# Patient Record
Sex: FEMALE | Race: WHITE | NOT HISPANIC OR LATINO | Employment: UNEMPLOYED | URBAN - NONMETROPOLITAN AREA
[De-identification: names, ages, dates, MRNs, and addresses within clinical notes are randomized per-mention and may not be internally consistent; named-entity substitution may affect disease eponyms.]

---

## 2021-10-22 ENCOUNTER — APPOINTMENT (OUTPATIENT)
Dept: CT IMAGING | Facility: HOSPITAL | Age: 56
End: 2021-10-22

## 2021-10-22 ENCOUNTER — HOSPITAL ENCOUNTER (EMERGENCY)
Facility: HOSPITAL | Age: 56
Discharge: ANOTHER HOSPITAL | End: 2021-10-22
Attending: EMERGENCY MEDICINE | Admitting: EMERGENCY MEDICINE

## 2021-10-22 VITALS
TEMPERATURE: 97.5 F | HEART RATE: 65 BPM | WEIGHT: 120 LBS | HEIGHT: 62 IN | OXYGEN SATURATION: 98 % | DIASTOLIC BLOOD PRESSURE: 69 MMHG | SYSTOLIC BLOOD PRESSURE: 117 MMHG | RESPIRATION RATE: 18 BRPM | BODY MASS INDEX: 22.08 KG/M2

## 2021-10-22 DIAGNOSIS — I61.9 INTRAPARENCHYMAL HEMORRHAGE OF BRAIN (HCC): ICD-10-CM

## 2021-10-22 DIAGNOSIS — S06.5XAA SUBDURAL HEMATOMA (HCC): ICD-10-CM

## 2021-10-22 DIAGNOSIS — S02.92XA MULTIPLE CLOSED FRACTURES OF FACIAL BONE, INITIAL ENCOUNTER (HCC): Primary | ICD-10-CM

## 2021-10-22 LAB
ALBUMIN SERPL-MCNC: 4.4 G/DL (ref 3.5–5.2)
ALBUMIN/GLOB SERPL: 1.6 G/DL
ALP SERPL-CCNC: 84 U/L (ref 39–117)
ALT SERPL W P-5'-P-CCNC: 16 U/L (ref 1–33)
ANION GAP SERPL CALCULATED.3IONS-SCNC: 11.1 MMOL/L (ref 5–15)
AST SERPL-CCNC: 20 U/L (ref 1–32)
BASOPHILS # BLD AUTO: 0.04 10*3/MM3 (ref 0–0.2)
BASOPHILS NFR BLD AUTO: 0.4 % (ref 0–1.5)
BILIRUB SERPL-MCNC: 0.4 MG/DL (ref 0–1.2)
BUN SERPL-MCNC: 13 MG/DL (ref 6–20)
BUN/CREAT SERPL: 14.4 (ref 7–25)
CALCIUM SPEC-SCNC: 9.4 MG/DL (ref 8.6–10.5)
CHLORIDE SERPL-SCNC: 106 MMOL/L (ref 98–107)
CO2 SERPL-SCNC: 24.9 MMOL/L (ref 22–29)
CREAT SERPL-MCNC: 0.9 MG/DL (ref 0.57–1)
DEPRECATED RDW RBC AUTO: 39.7 FL (ref 37–54)
EOSINOPHIL # BLD AUTO: 0.28 10*3/MM3 (ref 0–0.4)
EOSINOPHIL NFR BLD AUTO: 2.7 % (ref 0.3–6.2)
ERYTHROCYTE [DISTWIDTH] IN BLOOD BY AUTOMATED COUNT: 11.7 % (ref 12.3–15.4)
ETHANOL BLD-MCNC: <10 MG/DL (ref 0–10)
ETHANOL UR QL: <0.01 %
GFR SERPL CREATININE-BSD FRML MDRD: 65 ML/MIN/1.73
GLOBULIN UR ELPH-MCNC: 2.8 GM/DL
GLUCOSE SERPL-MCNC: 120 MG/DL (ref 65–99)
HCT VFR BLD AUTO: 39.8 % (ref 34–46.6)
HGB BLD-MCNC: 13.3 G/DL (ref 12–15.9)
IMM GRANULOCYTES # BLD AUTO: 0.06 10*3/MM3 (ref 0–0.05)
IMM GRANULOCYTES NFR BLD AUTO: 0.6 % (ref 0–0.5)
LIPASE SERPL-CCNC: 28 U/L (ref 13–60)
LYMPHOCYTES # BLD AUTO: 2.76 10*3/MM3 (ref 0.7–3.1)
LYMPHOCYTES NFR BLD AUTO: 26.3 % (ref 19.6–45.3)
MCH RBC QN AUTO: 30.9 PG (ref 26.6–33)
MCHC RBC AUTO-ENTMCNC: 33.4 G/DL (ref 31.5–35.7)
MCV RBC AUTO: 92.6 FL (ref 79–97)
MONOCYTES # BLD AUTO: 0.55 10*3/MM3 (ref 0.1–0.9)
MONOCYTES NFR BLD AUTO: 5.2 % (ref 5–12)
NEUTROPHILS NFR BLD AUTO: 6.82 10*3/MM3 (ref 1.7–7)
NEUTROPHILS NFR BLD AUTO: 64.8 % (ref 42.7–76)
NRBC BLD AUTO-RTO: 0 /100 WBC (ref 0–0.2)
PLATELET # BLD AUTO: 238 10*3/MM3 (ref 140–450)
PMV BLD AUTO: 8.7 FL (ref 6–12)
POTASSIUM SERPL-SCNC: 3.4 MMOL/L (ref 3.5–5.2)
PROT SERPL-MCNC: 7.2 G/DL (ref 6–8.5)
RBC # BLD AUTO: 4.3 10*6/MM3 (ref 3.77–5.28)
SODIUM SERPL-SCNC: 142 MMOL/L (ref 136–145)
WBC # BLD AUTO: 10.51 10*3/MM3 (ref 3.4–10.8)

## 2021-10-22 PROCEDURE — 25010000003 LIDOCAINE 1 % SOLUTION: Performed by: PHYSICIAN ASSISTANT

## 2021-10-22 PROCEDURE — 82077 ASSAY SPEC XCP UR&BREATH IA: CPT | Performed by: PHYSICIAN ASSISTANT

## 2021-10-22 PROCEDURE — 25010000002 TDAP 5-2.5-18.5 LF-MCG/0.5 SUSPENSION: Performed by: PHYSICIAN ASSISTANT

## 2021-10-22 PROCEDURE — 0 CEFAZOLIN SODIUM-DEXTROSE 2-3 GM-%(50ML) RECONSTITUTED SOLUTION: Performed by: PHYSICIAN ASSISTANT

## 2021-10-22 PROCEDURE — 70486 CT MAXILLOFACIAL W/O DYE: CPT

## 2021-10-22 PROCEDURE — 83690 ASSAY OF LIPASE: CPT | Performed by: PHYSICIAN ASSISTANT

## 2021-10-22 PROCEDURE — 25010000003 CEFAZOLIN SODIUM-DEXTROSE 2-3 GM-%(50ML) RECONSTITUTED SOLUTION: Performed by: PHYSICIAN ASSISTANT

## 2021-10-22 PROCEDURE — 90471 IMMUNIZATION ADMIN: CPT | Performed by: PHYSICIAN ASSISTANT

## 2021-10-22 PROCEDURE — 99283 EMERGENCY DEPT VISIT LOW MDM: CPT

## 2021-10-22 PROCEDURE — 96375 TX/PRO/DX INJ NEW DRUG ADDON: CPT

## 2021-10-22 PROCEDURE — 80053 COMPREHEN METABOLIC PANEL: CPT | Performed by: PHYSICIAN ASSISTANT

## 2021-10-22 PROCEDURE — 0 LIDOCAINE 1 % SOLUTION: Performed by: PHYSICIAN ASSISTANT

## 2021-10-22 PROCEDURE — 96365 THER/PROPH/DIAG IV INF INIT: CPT

## 2021-10-22 PROCEDURE — 25010000002 ONDANSETRON PER 1 MG: Performed by: EMERGENCY MEDICINE

## 2021-10-22 PROCEDURE — 90715 TDAP VACCINE 7 YRS/> IM: CPT | Performed by: PHYSICIAN ASSISTANT

## 2021-10-22 PROCEDURE — 85025 COMPLETE CBC W/AUTO DIFF WBC: CPT | Performed by: PHYSICIAN ASSISTANT

## 2021-10-22 PROCEDURE — 70450 CT HEAD/BRAIN W/O DYE: CPT

## 2021-10-22 PROCEDURE — 72125 CT NECK SPINE W/O DYE: CPT

## 2021-10-22 PROCEDURE — 25010000003 MORPHINE SULFATE (PF) 4 MG/ML SOLUTION: Performed by: EMERGENCY MEDICINE

## 2021-10-22 PROCEDURE — 0 MORPHINE SULFATE (PF) 4 MG/ML SOLUTION: Performed by: EMERGENCY MEDICINE

## 2021-10-22 RX ORDER — MORPHINE SULFATE 4 MG/ML
4 INJECTION, SOLUTION INTRAMUSCULAR; INTRAVENOUS ONCE
Status: COMPLETED | OUTPATIENT
Start: 2021-10-22 | End: 2021-10-22

## 2021-10-22 RX ORDER — ONDANSETRON 2 MG/ML
4 INJECTION INTRAMUSCULAR; INTRAVENOUS ONCE
Status: COMPLETED | OUTPATIENT
Start: 2021-10-22 | End: 2021-10-22

## 2021-10-22 RX ORDER — CEFAZOLIN SODIUM 2 G/50ML
2 SOLUTION INTRAVENOUS ONCE
Status: COMPLETED | OUTPATIENT
Start: 2021-10-22 | End: 2021-10-22

## 2021-10-22 RX ORDER — LIDOCAINE HYDROCHLORIDE 10 MG/ML
10 INJECTION, SOLUTION INFILTRATION; PERINEURAL ONCE
Status: COMPLETED | OUTPATIENT
Start: 2021-10-22 | End: 2021-10-22

## 2021-10-22 RX ADMIN — CEFAZOLIN SODIUM 2 G: 2 SOLUTION INTRAVENOUS at 16:52

## 2021-10-22 RX ADMIN — LIDOCAINE HYDROCHLORIDE 10 ML: 10 INJECTION, SOLUTION INFILTRATION; PERINEURAL at 16:53

## 2021-10-22 RX ADMIN — ONDANSETRON 4 MG: 2 INJECTION INTRAMUSCULAR; INTRAVENOUS at 15:56

## 2021-10-22 RX ADMIN — TETANUS TOXOID, REDUCED DIPHTHERIA TOXOID AND ACELLULAR PERTUSSIS VACCINE, ADSORBED 0.5 ML: 5; 2.5; 8; 8; 2.5 SUSPENSION INTRAMUSCULAR at 16:52

## 2021-10-22 RX ADMIN — MORPHINE SULFATE 4 MG: 4 INJECTION, SOLUTION INTRAMUSCULAR; INTRAVENOUS at 15:56

## 2021-10-22 NOTE — ED NOTES
Contacted [facility] for trauma per KRISTIN Carcamo. Awaiting a call back.     Maritza Bear  10/22/21 4722
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
## 2022-07-28 ENCOUNTER — HOSPITAL ENCOUNTER (EMERGENCY)
Facility: HOSPITAL | Age: 57
Discharge: HOME | End: 2022-07-28
Attending: EMERGENCY MEDICINE | Admitting: EMERGENCY MEDICINE

## 2022-07-28 VITALS
HEIGHT: 63 IN | WEIGHT: 149.4 LBS | DIASTOLIC BLOOD PRESSURE: 60 MMHG | HEART RATE: 63 BPM | BODY MASS INDEX: 26.47 KG/M2 | TEMPERATURE: 98 F | OXYGEN SATURATION: 96 % | SYSTOLIC BLOOD PRESSURE: 124 MMHG | RESPIRATION RATE: 20 BRPM

## 2022-07-28 DIAGNOSIS — R59.1 LYMPHADENOPATHY: Primary | ICD-10-CM

## 2022-07-28 PROCEDURE — 99282 EMERGENCY DEPT VISIT SF MDM: CPT

## 2022-07-28 RX ORDER — CLINDAMYCIN HYDROCHLORIDE 300 MG/1
300 CAPSULE ORAL 3 TIMES DAILY
Qty: 21 CAPSULE | Refills: 0 | Status: SHIPPED | OUTPATIENT
Start: 2022-07-28 | End: 2022-08-04

## 2022-07-28 RX ORDER — NAPROXEN 500 MG/1
500 TABLET ORAL 2 TIMES DAILY PRN
Qty: 14 TABLET | Refills: 0 | OUTPATIENT
Start: 2022-07-28 | End: 2022-08-10

## 2023-03-29 ENCOUNTER — HOSPITAL ENCOUNTER (EMERGENCY)
Facility: HOSPITAL | Age: 58
Discharge: HOME | End: 2023-03-29
Attending: EMERGENCY MEDICINE | Admitting: EMERGENCY MEDICINE
Payer: MEDICAID

## 2023-03-29 VITALS
WEIGHT: 150 LBS | SYSTOLIC BLOOD PRESSURE: 118 MMHG | RESPIRATION RATE: 16 BRPM | DIASTOLIC BLOOD PRESSURE: 70 MMHG | TEMPERATURE: 98.4 F | BODY MASS INDEX: 27.6 KG/M2 | HEIGHT: 62 IN | HEART RATE: 72 BPM | OXYGEN SATURATION: 96 %

## 2023-03-29 DIAGNOSIS — R11.2 NAUSEA AND VOMITING, UNSPECIFIED VOMITING TYPE: Primary | ICD-10-CM

## 2023-03-29 LAB
ALBUMIN SERPL-MCNC: 4.2 G/DL (ref 3.5–5.2)
ALBUMIN/GLOB SERPL: 1.3 G/DL
ALP SERPL-CCNC: 94 U/L (ref 39–117)
ALT SERPL W P-5'-P-CCNC: 14 U/L (ref 1–33)
ANION GAP SERPL CALCULATED.3IONS-SCNC: 11.5 MMOL/L (ref 5–15)
AST SERPL-CCNC: 18 U/L (ref 1–32)
BASOPHILS # BLD AUTO: 0.04 10*3/MM3 (ref 0–0.2)
BASOPHILS NFR BLD AUTO: 0.4 % (ref 0–1.5)
BILIRUB SERPL-MCNC: 0.3 MG/DL (ref 0–1.2)
BILIRUB UR QL STRIP: NEGATIVE
BUN SERPL-MCNC: 11 MG/DL (ref 6–20)
BUN/CREAT SERPL: 12.2 (ref 7–25)
CALCIUM SPEC-SCNC: 9.3 MG/DL (ref 8.6–10.5)
CHLORIDE SERPL-SCNC: 107 MMOL/L (ref 98–107)
CLARITY UR: CLEAR
CO2 SERPL-SCNC: 21.5 MMOL/L (ref 22–29)
COLOR UR: YELLOW
CREAT SERPL-MCNC: 0.9 MG/DL (ref 0.57–1)
DEPRECATED RDW RBC AUTO: 38.7 FL (ref 37–54)
EGFRCR SERPLBLD CKD-EPI 2021: 74.7 ML/MIN/1.73
EOSINOPHIL # BLD AUTO: 0.08 10*3/MM3 (ref 0–0.4)
EOSINOPHIL NFR BLD AUTO: 0.9 % (ref 0.3–6.2)
ERYTHROCYTE [DISTWIDTH] IN BLOOD BY AUTOMATED COUNT: 11.7 % (ref 12.3–15.4)
GLOBULIN UR ELPH-MCNC: 3.2 GM/DL
GLUCOSE SERPL-MCNC: 101 MG/DL (ref 65–99)
GLUCOSE UR STRIP-MCNC: NEGATIVE MG/DL
HCT VFR BLD AUTO: 42 % (ref 34–46.6)
HGB BLD-MCNC: 14 G/DL (ref 12–15.9)
HGB UR QL STRIP.AUTO: NEGATIVE
IMM GRANULOCYTES # BLD AUTO: 0.02 10*3/MM3 (ref 0–0.05)
IMM GRANULOCYTES NFR BLD AUTO: 0.2 % (ref 0–0.5)
KETONES UR QL STRIP: NEGATIVE
LEUKOCYTE ESTERASE UR QL STRIP.AUTO: NEGATIVE
LYMPHOCYTES # BLD AUTO: 1.87 10*3/MM3 (ref 0.7–3.1)
LYMPHOCYTES NFR BLD AUTO: 20.1 % (ref 19.6–45.3)
MCH RBC QN AUTO: 30.4 PG (ref 26.6–33)
MCHC RBC AUTO-ENTMCNC: 33.3 G/DL (ref 31.5–35.7)
MCV RBC AUTO: 91.1 FL (ref 79–97)
MONOCYTES # BLD AUTO: 0.36 10*3/MM3 (ref 0.1–0.9)
MONOCYTES NFR BLD AUTO: 3.9 % (ref 5–12)
NEUTROPHILS NFR BLD AUTO: 6.93 10*3/MM3 (ref 1.7–7)
NEUTROPHILS NFR BLD AUTO: 74.5 % (ref 42.7–76)
NITRITE UR QL STRIP: NEGATIVE
NRBC BLD AUTO-RTO: 0 /100 WBC (ref 0–0.2)
PH UR STRIP.AUTO: 6 [PH] (ref 5–8)
PLATELET # BLD AUTO: 279 10*3/MM3 (ref 140–450)
PMV BLD AUTO: 9 FL (ref 6–12)
POTASSIUM SERPL-SCNC: 4.2 MMOL/L (ref 3.5–5.2)
PROT SERPL-MCNC: 7.4 G/DL (ref 6–8.5)
PROT UR QL STRIP: NEGATIVE
RBC # BLD AUTO: 4.61 10*6/MM3 (ref 3.77–5.28)
SODIUM SERPL-SCNC: 140 MMOL/L (ref 136–145)
SP GR UR STRIP: <=1.005 (ref 1–1.03)
UROBILINOGEN UR QL STRIP: NORMAL
WBC NRBC COR # BLD: 9.3 10*3/MM3 (ref 3.4–10.8)

## 2023-03-29 PROCEDURE — 25010000002 ONDANSETRON PER 1 MG: Performed by: EMERGENCY MEDICINE

## 2023-03-29 PROCEDURE — 81003 URINALYSIS AUTO W/O SCOPE: CPT | Performed by: EMERGENCY MEDICINE

## 2023-03-29 PROCEDURE — 80053 COMPREHEN METABOLIC PANEL: CPT | Performed by: EMERGENCY MEDICINE

## 2023-03-29 PROCEDURE — 96374 THER/PROPH/DIAG INJ IV PUSH: CPT

## 2023-03-29 PROCEDURE — 99283 EMERGENCY DEPT VISIT LOW MDM: CPT

## 2023-03-29 PROCEDURE — 85025 COMPLETE CBC W/AUTO DIFF WBC: CPT | Performed by: EMERGENCY MEDICINE

## 2023-03-29 RX ORDER — DICYCLOMINE HCL 20 MG
20 TABLET ORAL EVERY 6 HOURS PRN
Qty: 20 TABLET | Refills: 0 | Status: SHIPPED | OUTPATIENT
Start: 2023-03-29

## 2023-03-29 RX ORDER — ONDANSETRON 4 MG/1
4 TABLET, ORALLY DISINTEGRATING ORAL 4 TIMES DAILY PRN
Qty: 20 TABLET | Refills: 0 | Status: SHIPPED | OUTPATIENT
Start: 2023-03-29

## 2023-03-29 RX ORDER — DICYCLOMINE HYDROCHLORIDE 10 MG/1
20 CAPSULE ORAL ONCE
Status: COMPLETED | OUTPATIENT
Start: 2023-03-29 | End: 2023-03-29

## 2023-03-29 RX ORDER — SODIUM CHLORIDE 0.9 % (FLUSH) 0.9 %
10 SYRINGE (ML) INJECTION AS NEEDED
Status: DISCONTINUED | OUTPATIENT
Start: 2023-03-29 | End: 2023-03-29 | Stop reason: HOSPADM

## 2023-03-29 RX ORDER — ONDANSETRON 2 MG/ML
4 INJECTION INTRAMUSCULAR; INTRAVENOUS ONCE
Status: COMPLETED | OUTPATIENT
Start: 2023-03-29 | End: 2023-03-29

## 2023-03-29 RX ADMIN — ONDANSETRON 4 MG: 2 INJECTION INTRAMUSCULAR; INTRAVENOUS at 17:43

## 2023-03-29 RX ADMIN — DICYCLOMINE HYDROCHLORIDE 20 MG: 10 CAPSULE ORAL at 17:44

## 2023-03-29 RX ADMIN — SODIUM CHLORIDE 1000 ML: 9 INJECTION, SOLUTION INTRAVENOUS at 17:41

## 2023-03-29 NOTE — ED PROVIDER NOTES
Subjective   History of Present Illness  57-year-old female presents to ED with chief complaint of nausea vomiting and abdominal pain.  Symptoms started earlier this morning.  Multiple family members with GI illness.  Diffuse generalized abdominal discomfort.  No fever or chills.  No cough shortness of breath or wheeze.  No other complaints this time.        Review of Systems   Constitutional: Negative for fatigue and fever.   Respiratory: Negative for shortness of breath.    Cardiovascular: Negative for chest pain and palpitations.   Gastrointestinal: Positive for abdominal pain, nausea and vomiting. Negative for diarrhea.   All other systems reviewed and are negative.      Past Medical History:   Diagnosis Date   • Diabetes mellitus        No Known Allergies    History reviewed. No pertinent surgical history.    History reviewed. No pertinent family history.    Social History     Socioeconomic History   • Marital status: Single   Tobacco Use   • Smoking status: Every Day     Packs/day: 0.50     Types: Cigarettes   • Smokeless tobacco: Never   Substance and Sexual Activity   • Alcohol use: Not Currently   • Drug use: Never   • Sexual activity: Defer           Objective   Physical Exam  Vitals and nursing note reviewed.   Constitutional:       General: She is not in acute distress.     Appearance: She is well-developed. She is not diaphoretic.   HENT:      Head: Normocephalic and atraumatic.      Nose: Nose normal.   Eyes:      Conjunctiva/sclera: Conjunctivae normal.   Cardiovascular:      Rate and Rhythm: Normal rate and regular rhythm.   Pulmonary:      Effort: Pulmonary effort is normal. No respiratory distress.      Breath sounds: Normal breath sounds.   Abdominal:      General: There is no distension.      Palpations: Abdomen is soft.      Tenderness: There is no abdominal tenderness. There is no guarding.   Musculoskeletal:         General: No deformity.   Neurological:      Mental Status: She is alert and  oriented to person, place, and time.      Cranial Nerves: No cranial nerve deficit.         Procedures           ED Course                                           MDM   Chief complaint: Nausea vomiting abdominal pain    Initial impression of presenting illness: 57-year-old well-appearing female with nausea vomiting and abdominal pain    Comorbidities requiring consideration and/or management: Diabetes    Differential diagnosis includes but not limited to: Viral gastroenteritis, bowel obstruction, pancreatitis, gastritis, other    Patient arrives hemodynamically stable, afebrile, without respiratory distress with initial vitals interpreted by myself.      Pertinent features from physical exam: Minimal abdominal tenderness to palpation, no guarding, no rigidity    Initial diagnostic plan: CBC, CMP, urinalysis, lipase, will consider CT imaging if indicated    Results from initial plan were reviewed and interpreted by myself and include: Normal urinalysis, CBC and CMP are reassuring      Diagnostic information from other sources includes: Review of previous visits, prior labs, prior imaging, available notes from prior evaluations or visits with specialists, medication list, allergies, past medical history, past surgical history    Interventions in the ED included: IV fluid hydration, antiemetics,    Reevaluation: Resting comfortably, abdomen is soft with minimal tenderness, given multiple family members with comorbidities have low suspicion for acute intra-abdominal process and suspect more likely a viral illness we will hold off on CT at this time    Results/clinical rationale were discussed with patient     Consultations and discussions of results with other physicians: N/A    Discussion of results/management/plan: Work-up most consistent with a likely viral gastroenteritis secondary to sick contacts.  Feeling better after symptomatic treatment    Disposition plan: Discharge, Rx management includes  antiemetics.    Final diagnoses:   Nausea and vomiting, unspecified vomiting type       ED Disposition  ED Disposition     ED Disposition   Discharge    Condition   Stable    Comment   --              oral and maxillofacial surgery  [phone]  [address] [address]             Medication List      New Prescriptions    dicyclomine 20 MG tablet  Commonly known as: BENTYL  Take 1 tablet by mouth Every 6 (Six) Hours As Needed (cramping).     ondansetron ODT 4 MG disintegrating tablet  Commonly known as: ZOFRAN-ODT  Place 1 tablet on the tongue 4 (Four) Times a Day As Needed for Nausea.           Where to Get Your Medications      These medications were sent to [id number] - [place], [place] - [address] AT [facility] - [phone] PH - [phone] FX  [address], [facility] [address]    Phone: [phone]   · dicyclomine 20 MG tablet  · ondansetron ODT 4 MG disintegrating tablet          Blas Ronquillo, DO  04/05/23 0739

## 2024-03-26 ENCOUNTER — ANESTHESIA EVENT (OUTPATIENT)
Dept: GASTROENTEROLOGY | Facility: HOSPITAL | Age: 59
End: 2024-03-26
Payer: MEDICAID

## 2024-03-26 ENCOUNTER — ANESTHESIA (OUTPATIENT)
Dept: GASTROENTEROLOGY | Facility: HOSPITAL | Age: 59
End: 2024-03-26
Payer: MEDICAID

## 2024-03-26 ENCOUNTER — HOSPITAL ENCOUNTER (EMERGENCY)
Facility: HOSPITAL | Age: 59
Discharge: HOME | End: 2024-03-26
Attending: STUDENT IN AN ORGANIZED HEALTH CARE EDUCATION/TRAINING PROGRAM | Admitting: INTERNAL MEDICINE
Payer: MEDICAID

## 2024-03-26 VITALS
BODY MASS INDEX: 26.17 KG/M2 | TEMPERATURE: 97.5 F | SYSTOLIC BLOOD PRESSURE: 134 MMHG | DIASTOLIC BLOOD PRESSURE: 82 MMHG | HEART RATE: 75 BPM | WEIGHT: 138.6 LBS | HEIGHT: 61 IN | OXYGEN SATURATION: 96 % | RESPIRATION RATE: 22 BRPM

## 2024-03-26 DIAGNOSIS — R13.10 DYSPHAGIA: ICD-10-CM

## 2024-03-26 DIAGNOSIS — R09.A2 GLOBUS SENSATION: Primary | ICD-10-CM

## 2024-03-26 PROCEDURE — 25810000003 SODIUM CHLORIDE 0.9 % SOLUTION: Performed by: NURSE ANESTHETIST, CERTIFIED REGISTERED

## 2024-03-26 PROCEDURE — 99285 EMERGENCY DEPT VISIT HI MDM: CPT

## 2024-03-26 PROCEDURE — 25010000002 METOCLOPRAMIDE PER 10 MG: Performed by: NURSE ANESTHETIST, CERTIFIED REGISTERED

## 2024-03-26 PROCEDURE — 43239 EGD BIOPSY SINGLE/MULTIPLE: CPT | Performed by: INTERNAL MEDICINE

## 2024-03-26 PROCEDURE — 99284 EMERGENCY DEPT VISIT MOD MDM: CPT | Performed by: INTERNAL MEDICINE

## 2024-03-26 PROCEDURE — 25010000002 PROPOFOL 10 MG/ML EMULSION: Performed by: NURSE ANESTHETIST, CERTIFIED REGISTERED

## 2024-03-26 PROCEDURE — 43247 EGD REMOVE FOREIGN BODY: CPT | Performed by: INTERNAL MEDICINE

## 2024-03-26 PROCEDURE — 96374 THER/PROPH/DIAG INJ IV PUSH: CPT

## 2024-03-26 PROCEDURE — 25010000002 GLUCAGON (RDNA) PER 1 MG: Performed by: STUDENT IN AN ORGANIZED HEALTH CARE EDUCATION/TRAINING PROGRAM

## 2024-03-26 RX ORDER — LIDOCAINE HCL/PF 100 MG/5ML
SYRINGE (ML) INJECTION AS NEEDED
Status: DISCONTINUED | OUTPATIENT
Start: 2024-03-26 | End: 2024-03-26 | Stop reason: SURG

## 2024-03-26 RX ORDER — IBUPROFEN 600 MG/1
1 TABLET ORAL ONCE
Qty: 1 MG | Refills: 0 | Status: COMPLETED | OUTPATIENT
Start: 2024-03-26 | End: 2024-03-26

## 2024-03-26 RX ORDER — SODIUM CHLORIDE 9 MG/ML
INJECTION, SOLUTION INTRAVENOUS CONTINUOUS PRN
Status: DISCONTINUED | OUTPATIENT
Start: 2024-03-26 | End: 2024-03-26 | Stop reason: SURG

## 2024-03-26 RX ORDER — PROPOFOL 10 MG/ML
VIAL (ML) INTRAVENOUS AS NEEDED
Status: DISCONTINUED | OUTPATIENT
Start: 2024-03-26 | End: 2024-03-26 | Stop reason: SURG

## 2024-03-26 RX ORDER — METOCLOPRAMIDE HYDROCHLORIDE 5 MG/ML
INJECTION INTRAMUSCULAR; INTRAVENOUS AS NEEDED
Status: DISCONTINUED | OUTPATIENT
Start: 2024-03-26 | End: 2024-03-26 | Stop reason: SURG

## 2024-03-26 RX ORDER — KETAMINE HCL IN NACL, ISO-OSM 100MG/10ML
SYRINGE (ML) INJECTION AS NEEDED
Status: DISCONTINUED | OUTPATIENT
Start: 2024-03-26 | End: 2024-03-26 | Stop reason: SURG

## 2024-03-26 RX ORDER — PANTOPRAZOLE SODIUM 40 MG/1
40 TABLET, DELAYED RELEASE ORAL DAILY
Qty: 30 TABLET | Refills: 1 | Status: SHIPPED | OUTPATIENT
Start: 2024-03-26

## 2024-03-26 RX ADMIN — SODIUM CHLORIDE: 9 INJECTION, SOLUTION INTRAVENOUS at 21:52

## 2024-03-26 RX ADMIN — PROPOFOL 100 MG: 10 INJECTION, EMULSION INTRAVENOUS at 21:58

## 2024-03-26 RX ADMIN — Medication 25 MG: at 21:58

## 2024-03-26 RX ADMIN — GLUCAGON 1 MG: KIT at 20:03

## 2024-03-26 RX ADMIN — METOCLOPRAMIDE 5 MG: 5 INJECTION, SOLUTION INTRAMUSCULAR; INTRAVENOUS at 22:05

## 2024-03-26 RX ADMIN — PROPOFOL 140 MCG/KG/MIN: 10 INJECTION, EMULSION INTRAVENOUS at 21:58

## 2024-03-26 RX ADMIN — Medication 40 MG: at 21:58

## 2024-03-26 NOTE — ED PROVIDER NOTES
Subjective:  History of Present Illness:    Patient is a 58-year-old female no significant medical history presents today with globus sensation.  Reports that she was eating a steak at [facility] when she felt as though it has been stuck.  States that she has food that is gotten intermittently hung in the past but is never had it been this persistent.  Denies any fevers.  No trauma.  Denies any preceding medical complaints.      Nurses Notes reviewed and agree, including vitals, allergies, social history and prior medical history.     REVIEW OF SYSTEMS: All systems reviewed and not pertinent unless noted.  Review of Systems   Constitutional:  Negative for activity change, appetite change, chills, fatigue and fever.   HENT:  Negative for rhinorrhea, sinus pressure and sinus pain.    Eyes:  Negative for discharge and itching.   Respiratory:  Negative for cough and shortness of breath.    Cardiovascular:  Negative for chest pain and leg swelling.   Gastrointestinal:  Negative for abdominal distention, abdominal pain, nausea and vomiting.        Lobus sensation   Endocrine: Negative for cold intolerance and heat intolerance.   Genitourinary:  Negative for decreased urine volume, difficulty urinating, flank pain, frequency, urgency, vaginal bleeding, vaginal discharge and vaginal pain.   Musculoskeletal:  Negative for gait problem, neck pain and neck stiffness.   Skin:  Negative for color change.   Allergic/Immunologic: Negative for environmental allergies.   Neurological:  Negative for seizures, syncope, facial asymmetry and speech difficulty.   Psychiatric/Behavioral:  Negative for self-injury and suicidal ideas.        Past Medical History:   Diagnosis Date    Hepatitis C        Allergies:    Patient has no known allergies.      History reviewed. No pertinent surgical history.      Social History     Socioeconomic History    Marital status: Single   Tobacco Use    Smoking status: Every Day     Current packs/day:  "0.50     Types: Cigarettes    Smokeless tobacco: Never   Vaping Use    Vaping status: Every Day   Substance and Sexual Activity    Alcohol use: Not Currently    Drug use: Not Currently    Sexual activity: Defer         History reviewed. No pertinent family history.    Objective  Physical Exam:  /82 (BP Location: Left arm, Patient Position: Lying)   Pulse 75   Temp 97.5 °F (36.4 °C) (Temporal)   Resp 22   Ht 154.9 cm (61\")   Wt 62.9 kg (138 lb 9.6 oz)   SpO2 96%   BMI 26.19 kg/m²      Physical Exam  Constitutional:       General: She is not in acute distress.     Appearance: Normal appearance. She is not ill-appearing.   HENT:      Head: Normocephalic and atraumatic.      Nose: Nose normal. No congestion or rhinorrhea.      Mouth/Throat:      Mouth: Mucous membranes are dry.      Pharynx: Oropharynx is clear. No oropharyngeal exudate or posterior oropharyngeal erythema.   Eyes:      Extraocular Movements: Extraocular movements intact.      Conjunctiva/sclera: Conjunctivae normal.      Pupils: Pupils are equal, round, and reactive to light.   Cardiovascular:      Rate and Rhythm: Normal rate and regular rhythm.      Pulses: Normal pulses.      Heart sounds: Normal heart sounds.   Pulmonary:      Effort: Pulmonary effort is normal. No respiratory distress.      Breath sounds: Normal breath sounds.   Abdominal:      General: Abdomen is flat. Bowel sounds are normal. There is no distension.      Palpations: Abdomen is soft.      Tenderness: There is no abdominal tenderness. There is no guarding or rebound.   Musculoskeletal:         General: No swelling or tenderness. Normal range of motion.      Cervical back: Normal range of motion and neck supple.   Skin:     General: Skin is warm and dry.      Capillary Refill: Capillary refill takes less than 2 seconds.   Neurological:      General: No focal deficit present.      Mental Status: She is alert and oriented to person, place, and time. Mental status is at " baseline.      Cranial Nerves: No cranial nerve deficit.      Sensory: No sensory deficit.      Motor: No weakness.      Coordination: Coordination normal.   Psychiatric:         Mood and Affect: Mood normal.         Behavior: Behavior normal.         Thought Content: Thought content normal.         Judgment: Judgment normal.         Procedures    ED Course:         Lab Results (last 24 hours)       ** No results found for the last 24 hours. **             No radiology results from the last 24 hrs       MDM      Initial impression of presenting illness: Globus sensation    DDX: includes but is not limited to: Impacted food bolus, esophageal perforation, esophageal trauma    Patient arrives stable with vitals interpreted by myself.     Pertinent features from physical exam: Clear oscitation, regular rhythm, no murmur, nontender gentle palpation.    Initial diagnostic plan: No need for lab workup, will attempt cola, if unsuccessful will attempt glucagon, if unsuccessful will consult to gastroenterology    Results from initial plan were reviewed and interpreted by me revealing patient continued to have persistent symptoms after cola and glucagon.    Diagnostic information from other sources: Reviewed past medical records    Interventions / Re-evaluation: Attempted: Challenge, glucagon with still persistent inability tolerate p.o.    Results/clinical rationale were discussed with patient at bedside    Consultations/Discussion of results with other physicians: Given my concern for impacted food bolus, discussed with gastroenterology who will take the patient for endoscopy    Disposition plan: To the OR  -----        Final diagnoses:   Globus sensation          Alexandre Kirkpatrick MD  03/26/24 0785

## 2024-03-27 NOTE — CONSULTS
In Patient Consult      Date of Consultation: 2024  Patient Name: Claudia Bosch  MRN: [id number]  : 1965     Referring provider: No att. providers found    Primary care provider:  Provider, No Known    Reason for consultation: Esophageal food bolus    History of Present Illness:.  58-year-old female patient with a prior history of chronic dysphagia, gastroesophageal reflux disease, hepatitis C previously treated was brought into emergency room tonight on 2024 with complaints of food bolus stuck in the esophagus.    She states that she has been having issues with swallowing especially with solids for a while now.  For the last couple of weeks she had a significant difficulty in swallowing solids.  She did have a food bolus stuck in the past but passed out spontaneously in the past no prior EGD.  Denies any prior esophageal stricture or dilatation.    Patient received glucagon and did not get any benefit and GI was consulted.    Subjective     Past Medical History:   Diagnosis Date    Hepatitis C        History reviewed. No pertinent surgical history.    History reviewed. No pertinent family history.    Social History     Socioeconomic History    Marital status: Single   Tobacco Use    Smoking status: Every Day     Current packs/day: 0.50     Types: Cigarettes    Smokeless tobacco: Never   Vaping Use    Vaping status: Every Day   Substance and Sexual Activity    Alcohol use: Not Currently    Drug use: Not Currently    Sexual activity: Defer       No current facility-administered medications for this encounter.    No Known Allergies    Review of Systems   Constitutional:  Negative for appetite change, fatigue, fever and unexpected weight loss.   HENT:  Positive for trouble swallowing.    Gastrointestinal:  Negative for abdominal distention, abdominal pain, anal bleeding, blood in stool, constipation, diarrhea, nausea, rectal pain, vomiting, GERD and indigestion.        The following  "portions of the patient's history were reviewed and updated as appropriate: allergies, current medications, past family history, past medical history, past social history, past surgical history and problem list.    Objective     Vitals:    03/26/24 1920 03/26/24 2129   BP: 129/74 135/63   BP Location: Left arm    Pulse: 88 74   Resp: 18 18   Temp: 97.9 °F (36.6 °C)    TempSrc: Oral    SpO2: 100% 95%   Weight: 62.9 kg (138 lb 9.6 oz)    Height: 154.9 cm (61\")        Physical Exam  Constitutional:       Appearance: Normal appearance.   HENT:      Head: Normocephalic and atraumatic.   Eyes:      Conjunctiva/sclera: Conjunctivae normal.   Abdominal:      General: Abdomen is flat. There is no distension.      Palpations: There is no mass.      Tenderness: There is no abdominal tenderness. There is no guarding or rebound.      Hernia: No hernia is present.   Musculoskeletal:      Cervical back: Normal range of motion and neck supple.   Neurological:      Mental Status: She is alert.               Invalid input(s): \"PROT\"    Imaging Results (Last 24 Hours)       ** No results found for the last 24 hours. **            Assessment / Plan      Assessment/Recommendations:     History of esophageal dysphagia  Esophageal obstruction secondary to food bolus  Gastroesophageal reflux disease    Patient history and presentation is very convincing of esophageal food bolus.  Hiatal hernia with esophageal ring or EOE.  She needs an urgent EGD for further management.  She may need a repeat EGD for esophageal dilatation will recommend after EGD.    I discussed the risk and benefits involved with the procedure and patient is agreeable to proceed with the procedure    Keep n.p.o.  Schedule for an urgent EGD  Recommend further after EGD    4.  History of hep C infection  As per patient it was treated before.    Thank you very much for letting me participate in the care of this patient.  Please do not hesitate to call me if you have any " questions.    Cata Greene MD  [facility] [place]  3/26/2024  21:56 EDT    Please note that portions of this note may have been completed with a voice recognition program.

## 2024-03-27 NOTE — H&P (VIEW-ONLY)
In Patient Consult      Date of Consultation: 2024  Patient Name: Claudia Bosch  MRN: [id number]  : 1965     Referring provider: No att. providers found    Primary care provider:  Provider, No Known    Reason for consultation: Esophageal food bolus    History of Present Illness:.  58-year-old female patient with a prior history of chronic dysphagia, gastroesophageal reflux disease, hepatitis C previously treated was brought into emergency room tonight on 2024 with complaints of food bolus stuck in the esophagus.    She states that she has been having issues with swallowing especially with solids for a while now.  For the last couple of weeks she had a significant difficulty in swallowing solids.  She did have a food bolus stuck in the past but passed out spontaneously in the past no prior EGD.  Denies any prior esophageal stricture or dilatation.    Patient received glucagon and did not get any benefit and GI was consulted.    Subjective     Past Medical History:   Diagnosis Date    Hepatitis C        History reviewed. No pertinent surgical history.    History reviewed. No pertinent family history.    Social History     Socioeconomic History    Marital status: Single   Tobacco Use    Smoking status: Every Day     Current packs/day: 0.50     Types: Cigarettes    Smokeless tobacco: Never   Vaping Use    Vaping status: Every Day   Substance and Sexual Activity    Alcohol use: Not Currently    Drug use: Not Currently    Sexual activity: Defer       No current facility-administered medications for this encounter.    No Known Allergies    Review of Systems   Constitutional:  Negative for appetite change, fatigue, fever and unexpected weight loss.   HENT:  Positive for trouble swallowing.    Gastrointestinal:  Negative for abdominal distention, abdominal pain, anal bleeding, blood in stool, constipation, diarrhea, nausea, rectal pain, vomiting, GERD and indigestion.        The following  "portions of the patient's history were reviewed and updated as appropriate: allergies, current medications, past family history, past medical history, past social history, past surgical history and problem list.    Objective     Vitals:    03/26/24 1920 03/26/24 2129   BP: 129/74 135/63   BP Location: Left arm    Pulse: 88 74   Resp: 18 18   Temp: 97.9 °F (36.6 °C)    TempSrc: Oral    SpO2: 100% 95%   Weight: 62.9 kg (138 lb 9.6 oz)    Height: 154.9 cm (61\")        Physical Exam  Constitutional:       Appearance: Normal appearance.   HENT:      Head: Normocephalic and atraumatic.   Eyes:      Conjunctiva/sclera: Conjunctivae normal.   Abdominal:      General: Abdomen is flat. There is no distension.      Palpations: There is no mass.      Tenderness: There is no abdominal tenderness. There is no guarding or rebound.      Hernia: No hernia is present.   Musculoskeletal:      Cervical back: Normal range of motion and neck supple.   Neurological:      Mental Status: She is alert.               Invalid input(s): \"PROT\"    Imaging Results (Last 24 Hours)       ** No results found for the last 24 hours. **            Assessment / Plan      Assessment/Recommendations:     History of esophageal dysphagia  Esophageal obstruction secondary to food bolus  Gastroesophageal reflux disease    Patient history and presentation is very convincing of esophageal food bolus.  Hiatal hernia with esophageal ring or EOE.  She needs an urgent EGD for further management.  She may need a repeat EGD for esophageal dilatation will recommend after EGD.    I discussed the risk and benefits involved with the procedure and patient is agreeable to proceed with the procedure    Keep n.p.o.  Schedule for an urgent EGD  Recommend further after EGD    4.  History of hep C infection  As per patient it was treated before.    Thank you very much for letting me participate in the care of this patient.  Please do not hesitate to call me if you have any " questions.    Cata Greene MD  [facility]  3/26/2024  21:56 EDT    Please note that portions of this note may have been completed with a voice recognition program.

## 2024-03-27 NOTE — ANESTHESIA POSTPROCEDURE EVALUATION
Patient: Claudia Bosch    Procedure Summary       Date: 03/26/24 Room / Location: [facility] ENDOSCOPY 2 / [facility] ENDOSCOPY    Anesthesia Start: 2152 Anesthesia Stop: 2209    Procedure: ESOPHAGOGASTRODUODENOSCOPY WITH FOREIGN BODY REMOVAL and biopsy Diagnosis:     Surgeons: Cata Greene MD Provider: Jose Jones CRNA    Anesthesia Type: MAC ASA Status: 2 - Emergent            Anesthesia Type: MAC    Vitals  HR 95  Sat 98  /71  Resp 12  Temp 98        Post Anesthesia Care and Evaluation    Patient location during evaluation: PACU  Patient participation: complete - patient participated  Level of consciousness: awake and alert and sleepy but conscious  Pain management: satisfactory to patient    Airway patency: patent  Anesthetic complications: No anesthetic complications    Cardiovascular status: acceptable and stable  Respiratory status: acceptable and nasal cannula  Hydration status: acceptable

## 2024-03-27 NOTE — DISCHARGE INSTRUCTIONS
- Discharge patient to home (ambulatory).   - Mechanical soft diet today.   - Chopped food from tomorrow  - Continue present medications.   - Await pathology results.   - protonix x 8 weeks   - Repeat upper endoscopy in 4 weeks for retreatment -dilation.   - Return to GI office in 8 weeks.

## 2024-03-27 NOTE — ANESTHESIA PREPROCEDURE EVALUATION
Anesthesia Evaluation     Patient summary reviewed and Nursing notes reviewed   NPO Solid Status: > 8 hours  NPO Liquid Status: > 8 hours           Airway   Mallampati: II  TM distance: >3 FB  Neck ROM: full  Possible difficult intubation  Dental - normal exam     Pulmonary - normal exam   (+) a smoker Current,  Cardiovascular - normal exam        Neuro/Psych  GI/Hepatic/Renal/Endo    (+) hepatitis C, liver disease    Musculoskeletal     Abdominal  - normal exam    Bowel sounds: normal.   Substance History      OB/GYN          Other                      Anesthesia Plan    ASA 2 - emergent     MAC     (Risks and benefits discussed including risk of aspiration, recall and dental damage. All patient questions answered. Will continue with POC. )  intravenous induction     Anesthetic plan, risks, benefits, and alternatives have been provided, discussed and informed consent has been obtained with: patient.  Pre-procedure education provided    CODE STATUS:

## 2024-03-28 LAB — REF LAB TEST METHOD: NORMAL

## 2024-04-16 DIAGNOSIS — R13.10 DYSPHAGIA, UNSPECIFIED TYPE: ICD-10-CM

## 2024-04-16 DIAGNOSIS — R09.A2 GLOBUS SENSATION: Primary | ICD-10-CM

## 2024-04-16 RX ORDER — SODIUM CHLORIDE 9 MG/ML
30 INJECTION, SOLUTION INTRAVENOUS CONTINUOUS PRN
OUTPATIENT
Start: 2024-04-16

## 2024-04-17 PROBLEM — R09.A2 GLOBUS SENSATION: Status: ACTIVE | Noted: 2024-04-16

## 2024-04-17 PROBLEM — R13.10 DYSPHAGIA: Status: ACTIVE | Noted: 2024-04-16

## 2024-04-19 ENCOUNTER — TELEMEDICINE (OUTPATIENT)
Dept: FAMILY MEDICINE CLINIC | Facility: TELEHEALTH | Age: 59
End: 2024-04-19
Payer: MEDICAID

## 2024-04-19 DIAGNOSIS — K04.7 DENTAL ABSCESS: Primary | ICD-10-CM

## 2024-04-19 RX ORDER — ONDANSETRON 4 MG/1
TABLET, FILM COATED ORAL
COMMUNITY
Start: 2024-03-18

## 2024-04-19 RX ORDER — AMOXICILLIN AND CLAVULANATE POTASSIUM 875; 125 MG/1; MG/1
1 TABLET, FILM COATED ORAL 2 TIMES DAILY
Qty: 20 TABLET | Refills: 0 | Status: SHIPPED | OUTPATIENT
Start: 2024-04-19 | End: 2024-04-29

## 2024-04-19 RX ORDER — GUAIFENESIN 600 MG/1
TABLET, EXTENDED RELEASE ORAL
COMMUNITY
Start: 2024-03-18

## 2024-04-19 NOTE — PROGRESS NOTES
You have chosen to receive care through a telehealth visit.  Do you consent to use a video/audio connection for your medical care today? Yes     CHIEF COMPLAINT  No chief complaint on file.        HPI  Claudia Bosch is a 58 y.o. female  presents with complaint of filling came out of back lower left tooth which is very painful, swollen.  Denies fever.  Has surgery for esophageal hernia planned for 4/25/24 and cannot get to dentist right now.     Review of Systems  See HPI    Past Medical History:   Diagnosis Date    Hepatitis C        No family history on file.    Social History     Socioeconomic History    Marital status: Single   Tobacco Use    Smoking status: Every Day     Current packs/day: 0.50     Types: Cigarettes    Smokeless tobacco: Never   Vaping Use    Vaping status: Every Day   Substance and Sexual Activity    Alcohol use: Not Currently    Drug use: Not Currently    Sexual activity: Defer       Claudia Bosch  reports that she has been smoking cigarettes. She has never used smokeless tobacco.              There were no vitals taken for this visit.    PHYSICAL EXAM  Physical Exam   Constitutional: She is oriented to person, place, and time. She appears well-developed and well-nourished. She does not have a sickly appearance. She does not appear ill.   HENT:   Head: Normocephalic and atraumatic.   Pulmonary/Chest: Effort normal.  No respiratory distress.  Neurological: She is alert and oriented to person, place, and time.           Diagnoses and all orders for this visit:    1. Dental abscess (Primary)  -     amoxicillin-clavulanate (AUGMENTIN) 875-125 MG per tablet; Take 1 tablet by mouth 2 (Two) Times a Day for 10 days.  Dispense: 20 tablet; Refill: 0    --take medications as prescribed  --increase fluids, rest as needed, tylenol or ibuprofen for pain  --f/u in 5-7 days with dentist if no improvement        FOLLOW-UP  As discussed during visit with PCP/Virtual Care if no improvement or Urgent  Care/Emergency Department if worsening of symptoms    Patient verbalizes understanding of medication dosage, comfort measures, instructions for treatment and follow-up.    Taryn Dowell, NANETTE  04/19/2024  15:23 EDT    The use of a video visit has been reviewed with the patient and verbal informed consent has been obtained. Myself and Claudia Bosch participated in this visit. The patient is located in [address] [place] [address].    I am located in [place], [place]. [employer] and [employer] were utilized. I spent 8 minutes in the patient's chart for this visit.      Note Disclaimer: At [facility], we believe that sharing information builds trust and better   relationships. You are receiving this note because you recently visited [facility]. It is possible you   will see health information before a provider has talked with you about it. This kind of information can   be easy to misunderstand. To help you fully understand what it means for your health, we urge you to   discuss this note with your provider.

## 2024-04-19 NOTE — PATIENT INSTRUCTIONS
Dental Abscess    A dental abscess is an infection around a tooth that may involve pain, swelling, and a collection of pus, as well as other symptoms. Treatment is important to help with symptoms and to prevent the infection from spreading.  The general types of dental abscesses are:  Pulpal abscess. This abscess may form from the inner part of the tooth (pulp).  Periodontal abscess. This abscess may form from the gum.  What are the causes?  This condition is caused by a bacterial infection in or around the tooth. It may result from:  Severe tooth decay (cavities).  Trauma to the tooth, such as a broken or chipped tooth.  What increases the risk?  This condition is more likely to develop in males. It is also more likely to develop in people who:  Have cavities.  Have severe gum disease.  Eat sugary snacks between meals.  Use tobacco products.  Have diabetes.  Have a weakened disease-fighting system (immune system).  Do not brush and care for their teeth regularly.  What are the signs or symptoms?  Mild symptoms of this condition include:  Tenderness.  Bad breath.  Fever.  A bitter taste in the mouth.  Pain in and around the infected tooth.  Moderate symptoms of this condition include:  Swollen neck glands.  Chills.  Pus drainage.  Swelling and redness around the infected tooth, in the mouth, or in the face.  Severe pain in and around the infected tooth.  Severe symptoms of this condition include:  Difficulty swallowing.  Difficulty opening the mouth.  Nausea.  Vomiting.  How is this diagnosed?  This condition is diagnosed based on:  Your symptoms and your medical and dental history.  An examination of the infected tooth. During the exam, your dental care provider may tap on the infected tooth.  You may also need to have X-rays taken of the affected area.  How is this treated?  This condition is treated by getting rid of the infection. This may be done with:  Antibiotic medicines. These may be used in certain  situations.  Antibacterial mouth rinse.  Incision and drainage. This procedure is done by making an incision in the abscess to drain out the pus. Removing pus is the first priority in treating an abscess.  A root canal. This may be performed to save the tooth. Your dental care provider accesses the visible part of your tooth (crown) with a drill and removes any infected pulp. Then the space is filled and sealed off.  Tooth extraction. The tooth is pulled out if it cannot be saved by other treatment.  You may also receive treatment for pain, such as:  Acetaminophen or NSAIDs.  Gels that contain a numbing medicine.  An injection to block the pain near your nerve.  Follow these instructions at home:  Medicines  Take over-the-counter and prescription medicines only as told by your dental care provider.  If you were prescribed an antibiotic, take it as told by your dental care provider. Do not stop taking the antibiotic even if you start to feel better.  If you were prescribed a gel that contains a numbing medicine, use it exactly as told in the directions. Do not use these gels for children who are younger than 2 years of age.  Use an antibacterial mouth rinse as told by your dental care provider.  General instructions    Gargle with a mixture of salt and water 3-4 times a day or as needed. To make salt water, completely dissolve ½-1 tsp (3-6 g) of salt in 1 cup (237 mL) of warm water.  Eat a soft diet while your abscess is healing.  Drink enough fluid to keep your urine pale yellow.  Do not apply heat to the outside of your mouth.  Do not use any products that contain nicotine or tobacco. These products include cigarettes, chewing tobacco, and vaping devices, such as e-cigarettes. If you need help quitting, ask your dental care provider.  Keep all follow-up visits. This is important.  How is this prevented?    Excellent dental home care, which includes brushing your teeth every morning and night with fluoride  toothpaste. Floss one time each day.  Get regularly scheduled dental cleanings.  Consider having a dental sealant applied on teeth that have deep grooves to prevent cavities.  Drink fluoridated water regularly. This includes most tap water. Check the label on bottled water to see if it contains fluoride.  Reduce or eliminate sugary drinks.  Eat healthy meals and snacks.  Wear a mouth guard or face shield to protect your teeth while playing sports.  Contact a health care provider if:  Your pain is worse and is not helped by medicine.  You have swelling.  You see pus around the tooth.  You have a fever or chills.  Get help right away if:  Your symptoms suddenly get worse.  You have a very bad headache.  You have problems breathing or swallowing.  You have trouble opening your mouth.  You have swelling in your neck or around your eye.  These symptoms may represent a serious problem that is an emergency. Do not wait to see if the symptoms will go away. Get medical help right away. Call your local emergency services (911 in the [place] Do not drive yourself to the hospital.  Summary  A dental abscess is a collection of pus in or around a tooth that results from an infection.  A dental abscess may result from severe tooth decay, trauma to the tooth, or severe gum disease around a tooth.  Symptoms include severe pain, swelling, redness, and drainage of pus in and around the infected tooth.  The first priority in treating a dental abscess is to drain out the pus. Treatment may also involve removing damage inside the tooth (root canal) or extracting the tooth.  This information is not intended to replace advice given to you by your health care provider. Make sure you discuss any questions you have with your health care provider.  Document Revised: 02/24/2022 Document Reviewed: 02/24/2022  Elsevier Patient Education © 2024 [employer]

## 2024-04-25 ENCOUNTER — ANESTHESIA (OUTPATIENT)
Dept: GASTROENTEROLOGY | Facility: HOSPITAL | Age: 59
End: 2024-04-25
Payer: MEDICAID

## 2024-04-25 ENCOUNTER — ANESTHESIA EVENT (OUTPATIENT)
Dept: GASTROENTEROLOGY | Facility: HOSPITAL | Age: 59
End: 2024-04-25
Payer: MEDICAID

## 2024-04-25 ENCOUNTER — HOSPITAL ENCOUNTER (OUTPATIENT)
Facility: HOSPITAL | Age: 59
Setting detail: HOSPITAL OUTPATIENT SURGERY
Discharge: HOME | End: 2024-04-25
Attending: INTERNAL MEDICINE | Admitting: INTERNAL MEDICINE
Payer: MEDICAID

## 2024-04-25 VITALS
RESPIRATION RATE: 16 BRPM | HEART RATE: 67 BPM | TEMPERATURE: 97.2 F | SYSTOLIC BLOOD PRESSURE: 127 MMHG | DIASTOLIC BLOOD PRESSURE: 59 MMHG | OXYGEN SATURATION: 100 %

## 2024-04-25 DIAGNOSIS — R09.A2 GLOBUS SENSATION: ICD-10-CM

## 2024-04-25 DIAGNOSIS — R13.10 DYSPHAGIA, UNSPECIFIED TYPE: ICD-10-CM

## 2024-04-25 PROCEDURE — C1726 CATH, BAL DIL, NON-VASCULAR: HCPCS | Performed by: INTERNAL MEDICINE

## 2024-04-25 PROCEDURE — 25010000002 FENTANYL CITRATE (PF) 50 MCG/ML SOLUTION PREFILLED SYRINGE: Performed by: NURSE ANESTHETIST, CERTIFIED REGISTERED

## 2024-04-25 PROCEDURE — 25810000003 SODIUM CHLORIDE 0.9 % SOLUTION: Performed by: PHYSICIAN ASSISTANT

## 2024-04-25 PROCEDURE — 25010000002 PROPOFOL 10 MG/ML EMULSION: Performed by: NURSE ANESTHETIST, CERTIFIED REGISTERED

## 2024-04-25 PROCEDURE — 43239 EGD BIOPSY SINGLE/MULTIPLE: CPT | Performed by: INTERNAL MEDICINE

## 2024-04-25 PROCEDURE — 43249 ESOPH EGD DILATION <30 MM: CPT | Performed by: INTERNAL MEDICINE

## 2024-04-25 RX ORDER — PROPOFOL 10 MG/ML
VIAL (ML) INTRAVENOUS AS NEEDED
Status: DISCONTINUED | OUTPATIENT
Start: 2024-04-25 | End: 2024-04-25 | Stop reason: SURG

## 2024-04-25 RX ORDER — ACETAMINOPHEN 325 MG/1
325 TABLET ORAL ONCE
Status: COMPLETED | OUTPATIENT
Start: 2024-04-25 | End: 2024-04-25

## 2024-04-25 RX ORDER — FENTANYL CITRATE 50 UG/ML
INJECTION, SOLUTION INTRAMUSCULAR; INTRAVENOUS AS NEEDED
Status: DISCONTINUED | OUTPATIENT
Start: 2024-04-25 | End: 2024-04-25 | Stop reason: SURG

## 2024-04-25 RX ORDER — SODIUM CHLORIDE 9 MG/ML
30 INJECTION, SOLUTION INTRAVENOUS CONTINUOUS PRN
Status: DISCONTINUED | OUTPATIENT
Start: 2024-04-25 | End: 2024-04-25 | Stop reason: HOSPADM

## 2024-04-25 RX ADMIN — LIDOCAINE HYDROCHLORIDE 60 MG: 20 INJECTION, SOLUTION INTRAVENOUS at 09:06

## 2024-04-25 RX ADMIN — FENTANYL CITRATE 50 MCG: 50 INJECTION, SOLUTION INTRAMUSCULAR; INTRAVENOUS at 09:02

## 2024-04-25 RX ADMIN — PROPOFOL 170 MCG/KG/MIN: 10 INJECTION, EMULSION INTRAVENOUS at 09:07

## 2024-04-25 RX ADMIN — PROPOFOL 100 MG: 10 INJECTION, EMULSION INTRAVENOUS at 09:06

## 2024-04-25 RX ADMIN — SODIUM CHLORIDE 30 ML/HR: 9 INJECTION, SOLUTION INTRAVENOUS at 07:58

## 2024-04-25 RX ADMIN — ACETAMINOPHEN 325 MG: 325 TABLET, FILM COATED ORAL at 09:51

## 2024-04-25 NOTE — ANESTHESIA PREPROCEDURE EVALUATION
Anesthesia Evaluation     Patient summary reviewed and Nursing notes reviewed   NPO Solid Status: > 8 hours  NPO Liquid Status: > 8 hours           Airway   Mallampati: II  TM distance: >3 FB  Neck ROM: full  Possible difficult intubation  Dental - normal exam     Pulmonary - normal exam   (+) a smoker Current,  Cardiovascular - negative cardio ROS and normal exam  Exercise tolerance: good (4-7 METS)        Neuro/Psych- negative ROS  GI/Hepatic/Renal/Endo    (+) hepatitis C, liver disease    Musculoskeletal (-) negative ROS    Abdominal  - normal exam    Bowel sounds: normal.   Substance History - negative use     OB/GYN negative ob/gyn ROS         Other                      Anesthesia Plan    ASA 2     MAC     (Risks and benefits discussed including risk of aspiration, recall and dental damage. All patient questions answered. Will continue with POC. )  intravenous induction     Anesthetic plan, risks, benefits, and alternatives have been provided, discussed and informed consent has been obtained with: patient.  Pre-procedure education provided      CODE STATUS:

## 2024-04-25 NOTE — DISCHARGE INSTRUCTIONS
- Discharge patient to home (ambulatory).   - Mechanical soft diet today.   - Low dose PPI longterm  - Await pathology results.   - Return to my office as previously scheduled or follow up in 8 weeks .  Rest today  No pushing,pulling,tugging,heavy lifting, or strenuous activity   No major decision making,driving,or drinking alcoholic beverages for 24 hours due to the sedation you received  Always use good hand hygiene/washing technique  No driving on pain medication.To assist you in voiding:  Drink plenty of fluids  Listen to running water while attempting to void.    If you are unable to urinate and you have an uncomfortable urge to void or it has been   6 hours since you were discharged, return to the Emergency Room

## 2024-04-25 NOTE — ANESTHESIA POSTPROCEDURE EVALUATION
Patient: Claudia Bosch    Procedure Summary       Date: 04/25/24 Room / [facility]: [facility] END[facility]COPY [facility]    Anesthesia Start: 0854 Anesthesia Stop: 0921    Procedure: ESOPHAGOGASTRODUODENOSCOPY WITH DILATATION AND BIOPSY (Esophagus) Diagnosis:       Globus sensation      Dysphagia, unspecified type      (Globus sensation [R09.A2])      (Dysphagia, unspecified type [R13.10])    Surgeons: Cata Greene MD Provider: Óscar White CRNA    Anesthesia Type: MAC ASA Status: 2            Anesthesia Type: MAC    Vitals  No vitals data found for the desired time range.          Post Anesthesia Care and Evaluation    Patient location during evaluation: bedside  Patient participation: complete - patient participated  Level of consciousness: awake and alert  Pain score: 0  Pain management: satisfactory to patient    Airway patency: patent  Anesthetic complications: No anesthetic complications  PONV Status: none  Cardiovascular status: acceptable and stable  Respiratory status: acceptable  Hydration status: acceptable    Comments: Vitals signs as noted in nursing documentation as per protocol.

## 2024-04-26 LAB — REF LAB TEST METHOD: NORMAL

## 2024-05-21 RX ORDER — PANTOPRAZOLE SODIUM 40 MG/1
40 TABLET, DELAYED RELEASE ORAL DAILY
Qty: 30 TABLET | Refills: 1 | Status: SHIPPED | OUTPATIENT
Start: 2024-05-21

## 2024-09-28 ENCOUNTER — TELEMEDICINE (OUTPATIENT)
Dept: FAMILY MEDICINE CLINIC | Facility: TELEHEALTH | Age: 59
End: 2024-09-28
Payer: MEDICAID

## 2024-09-28 DIAGNOSIS — R59.9 ADENOPATHY: ICD-10-CM

## 2024-09-28 DIAGNOSIS — H92.01 EARACHE ON RIGHT: Primary | ICD-10-CM

## 2024-09-28 PROBLEM — S06.5XAA SUBDURAL HEMATOMA: Status: ACTIVE | Noted: 2021-10-22

## 2024-09-28 PROBLEM — S02.19XA TEMPORAL BONE FRACTURE: Status: ACTIVE | Noted: 2021-10-22

## 2024-09-28 PROBLEM — R73.9 HYPERGLYCEMIA: Status: ACTIVE | Noted: 2021-10-23

## 2024-09-28 PROBLEM — I61.9 INTRAPARENCHYMAL HEMORRHAGE OF BRAIN: Status: ACTIVE | Noted: 2021-10-22

## 2024-09-28 PROBLEM — S09.8XXA BLUNT HEAD TRAUMA: Status: ACTIVE | Noted: 2021-10-22

## 2024-09-28 PROBLEM — I60.9 SUBARACHNOID HEMORRHAGE: Status: ACTIVE | Noted: 2021-10-22

## 2024-09-28 PROBLEM — S02.401A MAXILLARY SINUS FRACTURE: Status: ACTIVE | Noted: 2021-10-22

## 2024-09-28 PROBLEM — S02.85XA: Status: ACTIVE | Noted: 2021-10-22

## 2024-09-28 PROBLEM — I70.90 ATHEROSCLEROSIS: Status: ACTIVE | Noted: 2021-10-23

## 2024-09-28 PROBLEM — E04.9 ENLARGED THYROID: Status: ACTIVE | Noted: 2021-10-23

## 2024-09-28 PROBLEM — F17.200 SMOKER: Status: ACTIVE | Noted: 2021-10-23

## 2024-09-28 PROBLEM — B19.20 HEPATITIS C: Status: ACTIVE | Noted: 2021-10-22

## 2024-09-28 PROBLEM — S02.2XXA NASAL BONE FRACTURE: Status: ACTIVE | Noted: 2021-10-22

## 2024-09-28 PROBLEM — N28.1 BILATERAL RENAL CYSTS: Status: ACTIVE | Noted: 2021-10-23

## 2024-09-28 PROCEDURE — 1159F MED LIST DOCD IN RCRD: CPT | Performed by: NURSE PRACTITIONER

## 2024-09-28 PROCEDURE — 1160F RVW MEDS BY RX/DR IN RCRD: CPT | Performed by: NURSE PRACTITIONER

## 2024-09-28 PROCEDURE — 99213 OFFICE O/P EST LOW 20 MIN: CPT | Performed by: NURSE PRACTITIONER

## 2024-09-28 RX ORDER — ERGOCALCIFEROL 1.25 MG/1
1 CAPSULE, LIQUID FILLED ORAL WEEKLY
COMMUNITY
Start: 2024-07-16

## 2024-12-05 ENCOUNTER — HOSPITAL ENCOUNTER (EMERGENCY)
Facility: HOSPITAL | Age: 59
Discharge: HOME | End: 2024-12-05
Attending: EMERGENCY MEDICINE
Payer: MEDICAID

## 2024-12-05 VITALS
TEMPERATURE: 98.1 F | RESPIRATION RATE: 16 BRPM | BODY MASS INDEX: 23 KG/M2 | SYSTOLIC BLOOD PRESSURE: 112 MMHG | HEART RATE: 69 BPM | OXYGEN SATURATION: 100 % | WEIGHT: 125 LBS | DIASTOLIC BLOOD PRESSURE: 64 MMHG | HEIGHT: 62 IN

## 2024-12-05 DIAGNOSIS — R09.A2 GLOBUS SENSATION: Primary | ICD-10-CM

## 2024-12-05 LAB
BASOPHILS # BLD AUTO: 0.05 10*3/MM3 (ref 0–0.2)
BASOPHILS NFR BLD AUTO: 0.3 % (ref 0–1.5)
DEPRECATED RDW RBC AUTO: 39 FL (ref 37–54)
EOSINOPHIL # BLD AUTO: 0.09 10*3/MM3 (ref 0–0.4)
EOSINOPHIL NFR BLD AUTO: 0.5 % (ref 0.3–6.2)
ERYTHROCYTE [DISTWIDTH] IN BLOOD BY AUTOMATED COUNT: 11.3 % (ref 12.3–15.4)
HCT VFR BLD AUTO: 34.1 % (ref 34–46.6)
HGB BLD-MCNC: 11.2 G/DL (ref 12–15.9)
IMM GRANULOCYTES # BLD AUTO: 0.23 10*3/MM3 (ref 0–0.05)
IMM GRANULOCYTES NFR BLD AUTO: 1.3 % (ref 0–0.5)
INR PPP: 1.01 (ref 0.9–1.1)
LYMPHOCYTES # BLD AUTO: 1.86 10*3/MM3 (ref 0.7–3.1)
LYMPHOCYTES NFR BLD AUTO: 10.4 % (ref 19.6–45.3)
MCH RBC QN AUTO: 30.9 PG (ref 26.6–33)
MCHC RBC AUTO-ENTMCNC: 32.8 G/DL (ref 31.5–35.7)
MCV RBC AUTO: 94.2 FL (ref 79–97)
MONOCYTES # BLD AUTO: 0.71 10*3/MM3 (ref 0.1–0.9)
MONOCYTES NFR BLD AUTO: 4 % (ref 5–12)
NEUTROPHILS NFR BLD AUTO: 14.94 10*3/MM3 (ref 1.7–7)
NEUTROPHILS NFR BLD AUTO: 83.5 % (ref 42.7–76)
NRBC BLD AUTO-RTO: 0 /100 WBC (ref 0–0.2)
PLATELET # BLD AUTO: 402 10*3/MM3 (ref 140–450)
PMV BLD AUTO: 8.4 FL (ref 6–12)
PROTHROMBIN TIME: 13.8 SECONDS (ref 12.3–15.1)
RBC # BLD AUTO: 3.62 10*6/MM3 (ref 3.77–5.28)
WBC NRBC COR # BLD AUTO: 17.88 10*3/MM3 (ref 3.4–10.8)

## 2024-12-05 PROCEDURE — 99283 EMERGENCY DEPT VISIT LOW MDM: CPT | Performed by: EMERGENCY MEDICINE

## 2024-12-05 PROCEDURE — 25010000002 ONDANSETRON PER 1 MG: Performed by: EMERGENCY MEDICINE

## 2024-12-05 PROCEDURE — 96374 THER/PROPH/DIAG INJ IV PUSH: CPT

## 2024-12-05 PROCEDURE — 85025 COMPLETE CBC W/AUTO DIFF WBC: CPT | Performed by: EMERGENCY MEDICINE

## 2024-12-05 PROCEDURE — 96375 TX/PRO/DX INJ NEW DRUG ADDON: CPT

## 2024-12-05 PROCEDURE — 85610 PROTHROMBIN TIME: CPT | Performed by: EMERGENCY MEDICINE

## 2024-12-05 PROCEDURE — 25810000003 SODIUM CHLORIDE 0.9 % SOLUTION: Performed by: EMERGENCY MEDICINE

## 2024-12-05 PROCEDURE — 25010000002 GLUCAGON (RDNA) PER 1 MG: Performed by: EMERGENCY MEDICINE

## 2024-12-05 PROCEDURE — 93005 ELECTROCARDIOGRAM TRACING: CPT | Performed by: EMERGENCY MEDICINE

## 2024-12-05 RX ORDER — IBUPROFEN 600 MG/1
1 TABLET ORAL ONCE
Status: COMPLETED | OUTPATIENT
Start: 2024-12-05 | End: 2024-12-05

## 2024-12-05 RX ORDER — PANTOPRAZOLE SODIUM 40 MG/10ML
80 INJECTION, POWDER, LYOPHILIZED, FOR SOLUTION INTRAVENOUS ONCE
Status: COMPLETED | OUTPATIENT
Start: 2024-12-05 | End: 2024-12-05

## 2024-12-05 RX ORDER — FAMOTIDINE 20 MG/1
20 TABLET, FILM COATED ORAL NIGHTLY PRN
Qty: 30 TABLET | Refills: 0 | Status: SHIPPED | OUTPATIENT
Start: 2024-12-05 | End: 2025-01-04

## 2024-12-05 RX ORDER — SODIUM CHLORIDE 9 MG/ML
50 INJECTION, SOLUTION INTRAVENOUS CONTINUOUS
Status: DISCONTINUED | OUTPATIENT
Start: 2024-12-05 | End: 2024-12-05 | Stop reason: HOSPADM

## 2024-12-05 RX ORDER — ONDANSETRON 2 MG/ML
4 INJECTION INTRAMUSCULAR; INTRAVENOUS ONCE
Status: COMPLETED | OUTPATIENT
Start: 2024-12-05 | End: 2024-12-05

## 2024-12-05 RX ADMIN — ONDANSETRON 4 MG: 2 INJECTION INTRAMUSCULAR; INTRAVENOUS at 15:42

## 2024-12-05 RX ADMIN — SODIUM CHLORIDE 50 ML/HR: 9 INJECTION, SOLUTION INTRAVENOUS at 16:41

## 2024-12-05 RX ADMIN — PANTOPRAZOLE SODIUM 80 MG: 40 INJECTION, POWDER, FOR SOLUTION INTRAVENOUS at 16:41

## 2024-12-05 RX ADMIN — Medication 1 MG: at 15:41

## 2024-12-05 NOTE — ED NOTES
Pt provided with soda att and tolerated well. States that she is able to swallow now. Provider notified.

## 2024-12-05 NOTE — ED NOTES
Pt provided with water to drink. Pt states that she is feeling better and the water is going down. Pt reports that she does not want to be transferred. Provider notified att.

## 2024-12-05 NOTE — ED NOTES
[facility] returned call att with Dr. Kowalski on to speak with Dr. Jean. Call transferred

## 2024-12-05 NOTE — ED NOTES
Called and spoke to [facility] in regards to transfer. They stated they are waiting on Dr. Kowalski to call them back so we can initiate transfer.

## 2024-12-05 NOTE — ED NOTES
"Lila from [facility] returned call with no doctor on the line but insisted she speak to Dr. Jean. Stated \"I need to talk to him about what the dr said\" Call transferred.  "

## 2024-12-05 NOTE — ED NOTES
Called and spoke to [facility] in regards to setting up a transfer per Dr. Jean. Awaiting call back.

## 2024-12-05 NOTE — DISCHARGE INSTRUCTIONS
If you notice any concerning symptoms, please return to the ER immediately. These can include but are not limited to: worsening of you condition, fevers, chills, shortness of breath, vomiting, weakness of the extremities, changes in your mental status, numbness, pale extremities, or chest pain.     Take medications as prescribed, your pharmacist may have additional recommendations concerning these medications.    For pain use ibuprofen (Motrin) or acetaminophen (Tylenol), unless prescribed medications that also contain these medications.  You can take over the counter acetaminophen or ibuprofen, please follow the directions as dosages differ. Do not take ibuprofen if you have a history of peptic ulcers, kidney disease, bariatric surgery, or are currently pregnant.  Do not take Tylenol if you have a history of liver disease or alcohol use disorder.        THANK YOU!!! From [facility] Emergency Department    On behalf of the Emergency Department staff at [facility], I would like to thank you for giving us the opportunity to address your health care needs and concerns. We hope that during your visit, our service was delivered in a professional and caring manner. Please keep [facility] in mind as we walk with you down the path to your own personal wellness. Please expect follow-up phone calls concerning additional care and questions about your experience.      You have received additional information specific to your diagnosis in these discharge instructions, please read these fully.  Anytime you have been seen in the emergency department we recommend close follow up with your primary care provider or specialist, please follow these directions as indicated.

## 2024-12-05 NOTE — ED NOTES
Called and spoke to [facility] in regards to a transfer per Dr. Jean. [facility] stated they are on a waitlist with 8 people ahead of this pt but he has been added to waitlist.

## 2024-12-05 NOTE — ED PROVIDER NOTES
"         [facility] EMERGENCY DEPARTMENT  Emergency Department Encounter  Emergency Medicine Physician Note     Pt Name:Claudia Bosch  MRN: [id number]  Birthdate 1965  Date of evaluation: 12/5/2024  PCP:  Provider, No Known  Note Started: 2:53 PM EST      CHIEF COMPLAINT       Chief Complaint   Patient presents with   • food bolus       HISTORY OF PRESENT ILLNESS  (Location/Symptom, Timing/Onset, Context/Setting, Quality, Duration, Modifying Factors, Severity.)      Claudia Bosch is a 59 y.o. female who presents with reported ham or turkey stuck in her throat.  Patient reports eating leftover ham and turkey for Thanksgiving at around 130 today.  Patient states it has been stuck there today.  Patient states she has had a history of \"esophageal hernia\" and had dilation after an foreign body removed previously.  Patient states that she can tolerate secretions but is unable to tolerate drinking any kind of liquids and a larger amount.  Patient describes it stuck around the thyroid cartilage.    PAST MEDICAL / SURGICAL / SOCIAL / FAMILY HISTORY     Past Medical History:   Diagnosis Date   • Hepatitis C      No additional pertinent       Past Surgical History:   Procedure Laterality Date   • ENDOSCOPY N/A 4/25/2024    Procedure: ESOPHAGOGASTRODUODENOSCOPY WITH DILATATION AND BIOPSY;  Surgeon: Cata Greene MD;  Location: [facility] ENDOSCOPY;  Service: Gastroenterology;  Laterality: N/A;   • ENDOSCOPY WITH FOREIGN BODY REMOVAL N/A 3/26/2024    Procedure: ESOPHAGOGASTRODUODENOSCOPY WITH FOREIGN BODY REMOVAL and biopsy;  Surgeon: Cata Greene MD;  Location: [facility] ENDOSCOPY;  Service: Gastroenterology;  Laterality: N/A;     No additional pertinent       Social History     Socioeconomic History   • Marital status: Single   Tobacco Use   • Smoking status: Every Day     Current packs/day: 0.50     Types: Cigarettes     Passive exposure: Never   • Smokeless tobacco: Never   Vaping Use   • " "Vaping status: Every Day   Substance and Sexual Activity   • Alcohol use: Not Currently   • Drug use: Not Currently   • Sexual activity: Defer       History reviewed. No pertinent family history.    Allergies:  Patient has no known allergies.    Home Medications:  Prior to Admission medications    Medication Sig Start Date End Date Taking? Authorizing Provider   vitamin D (ERGOCALCIFEROL) 1.25 MG (69803 UT) capsule capsule Take 1 capsule by mouth 1 (One) Time Per Week. 7/16/24   Provider, Historical, MD         REVIEW OF SYSTEMS       Review of Systems   Constitutional:  Negative for diaphoresis and fever.   HENT:  Positive for drooling and trouble swallowing.    Respiratory:  Negative for chest tightness and shortness of breath.    Cardiovascular:  Negative for chest pain.   Gastrointestinal:  Positive for nausea and vomiting. Negative for abdominal pain.   Musculoskeletal:  Negative for neck pain (Patient describes foreign body sensation).       PHYSICAL EXAM      INITIAL VITALS:   /64 (BP Location: Left arm, Patient Position: Sitting)   Pulse 69   Temp 98.1 °F (36.7 °C) (Oral)   Resp 16   Ht 157.5 cm (62\")   Wt 56.7 kg (125 lb)   SpO2 100%   BMI 22.86 kg/m²     Physical Exam  Constitutional:       Appearance: Normal appearance.   HENT:      Head: Normocephalic and atraumatic.      Mouth/Throat:      Mouth: Mucous membranes are moist.      Pharynx: Oropharynx is clear. No oropharyngeal exudate or posterior oropharyngeal erythema.      Comments: No foreign body noted on visual examination  Cardiovascular:      Rate and Rhythm: Normal rate and regular rhythm.      Pulses: Normal pulses.   Pulmonary:      Effort: Pulmonary effort is normal.      Breath sounds: Normal breath sounds. No stridor.   Abdominal:      General: Abdomen is flat. There is no distension.      Palpations: Abdomen is soft.      Tenderness: There is no abdominal tenderness. There is no guarding.   Musculoskeletal:         General: " Normal range of motion.   Skin:     General: Skin is warm and dry.   Neurological:      General: No focal deficit present.      Mental Status: She is alert and oriented to person, place, and time.   Psychiatric:         Mood and Affect: Mood normal.         Behavior: Behavior normal.           DDX/DIAGNOSTIC RESULTS / EMERGENCY DEPARTMENT COURSE / MDM     Differential Diagnosis included but not limited: food bolus, stricture, gastroesophageal reflux disease    Diagnoses Considered but Do Not Suspect:  acs, boerhaave,     Decision Rules/Scores utilized: N/A     Tests considered but not ordered and why:  N/A     MIPS: N/A     Code Status Discussion:  Not Discussed    Additional Patient Education Provided: None     Medical Decision Making    Medical Decision Making  Patient presenting with globus sensation of her throat.  Patient clinically well stable vitals.  Patient looks well, it is able to tolerate secretions, airways intact and no stridor.  Patient had a history of esophageal stricture in the past with food bolus.  Do feel the patient is experiencing a food bolus at this time.  Patient was given 1 mg of glucagon, IV started, initial plan to obtain no labs this patient.  Clinically well feels isolated food bolus and just needs EGD.  Gastroenterology is not available at our facility and working to transfer patient.  Food bolus ended up moving out of the esophagus without any complication after the glucagon.  Patient had full improvement of symptoms.  Patient was observed in the emerged part for multiple hours.  Patient was able to tolerate p.o. without any difficulty, patient states she returned fully back to baseline.  Labs were initially obtained based upon request from outside transferring facility, able to feed, normality or complications.  EKG demonstrated no acute STEMI or complications.  With improvement of patient's symptoms do not feel additional laboratory work was necessary and do feel patient was stable  for discharge home.  Patient was agreeable with the plan.  Patient instructed to follow-up with her gastroenterologist as she may be having a worsening stricture again and may need close evaluation and possible repeat EGD.  Patient struck to return if he has any difficulty swallowing, difficulty breathing or any other concerns.    Problems Addressed:  Globus sensation: complicated acute illness or injury    Amount and/or Complexity of Data Reviewed  External Data Reviewed: labs, radiology and notes.     Details: Images from EGD evaluated, notes evaluated and previous labs  Labs:  Decision-making details documented in ED Course.  ECG/medicine tests: ordered.  Discussion of management or test interpretation with external provider(s): Patient's case discussed with gastroenterology at outside institutions.  Patient's case also discussed with hospitalist for possible admission.    Risk  Prescription drug management.        See ED COURSE for additional MDM statements    EKG  None Performed     All EKG's are interpreted by the Emergency Department Physician who either signs or Co-signs this chart in the absence of a cardiologist.    Additional Scores                   EMERGENCY DEPARTMENT COURSE:    ED Course as of 12/06/24 1006   Thu Dec 05, 2024   1610 Patient describes the globus sensation as lowering deeper into the throat after having the glucagon.  Patient does state that she still has an obstruction at this time. [CR]   1625 Patient case discussed with GI at [facility], they recommended ER to ER transfer, recommended Protonix, ECG, INR CMP and CBC for further evaluation.  Will discuss with ER for transfer [CR]   1650 Patient is noted to be excepted at [facility] for evaluation by GI and emergent scope this evening.  This is the first facility to accept the patient for emergent evaluation.  Will have patient go to [facility]. [CR]   1705 WBC(!): 17.88  Patient noted to have nonspecific  leukocytosis. [CR]   1720 Patient tolerating p.o. intake at this time, is drinking a whole glass of water without any issues.  Do feel food bolus is no longer present.  Will have patient follow-up with GI outpatient, instructed her to return for any worsening difficulty eating, swallowing, or any other concerns [CR]   1722 Patient able to tolerate carbonated beverage without any difficulty. [CR]      ED Course User Index  [CR] Curtis Jean DO       PROCEDURES:  None Performed   Procedures    DATA FOR LAB AND RADIOLOGY TESTS ORDERED BELOW ARE REVIEWED BY THE ED CLINICIAN:    RADIOLOGY: All x-rays, CT, MRI, and formal ultrasound images (except ED bedside ultrasound) are read by the radiologist, see reports below, unless otherwise noted in MDM or here.  Reports below are reviewed by myself.  No orders to display       LABS: Lab orders shown below, the results are reviewed by myself, and all abnormals are listed below.  Labs Reviewed   CBC WITH AUTO DIFFERENTIAL - Abnormal; Notable for the following components:       Result Value    WBC 17.88 (*)     RBC 3.62 (*)     Hemoglobin 11.2 (*)     RDW 11.3 (*)     Neutrophil % 83.5 (*)     Lymphocyte % 10.4 (*)     Monocyte % 4.0 (*)     Immature Grans % 1.3 (*)     Neutrophils, Absolute 14.94 (*)     Immature Grans, Absolute 0.23 (*)     All other components within normal limits   PROTIME-INR - Normal    Narrative:     Suggested INR therapeutic range for stable oral anticoagulant therapy:    Low Intensity therapy:   1.5-2.0  Moderate Intensity therapy:   2.0-3.0  High Intensity therapy:   2.5-4.0       Vitals Reviewed:    Vitals:    12/05/24 1601 12/05/24 1651 12/05/24 1700 12/05/24 1834   BP: 150/82  109/61 112/64   BP Location:    Left arm   Patient Position:    Sitting   Pulse:  73 68 69   Resp:    16   Temp:    98.1 °F (36.7 °C)   TempSrc:    Oral   SpO2: 99%   100%   Weight:       Height:           MEDICATIONS GIVEN TO PATIENT THIS ENCOUNTER:  Medications    ondansetron (ZOFRAN) injection 4 mg (4 mg Intravenous Given 12/5/24 1542)   Glucagon (GLUCAGEN) injection 1 mg (1 mg Intravenous Given 12/5/24 1541)   pantoprazole (PROTONIX) injection 80 mg (80 mg Intravenous Given 12/5/24 1641)       CONSULTS:  None    CRITICAL CARE:  There was significant risk of life threatening deterioration of patient's condition requiring my direct management. Critical care time  minutes, excluding any documented procedures.    FINAL IMPRESSION      1. Globus sensation          DISPOSITION / PLAN     ED Disposition       ED Disposition   Discharge    Condition   Stable    Comment   --               PATIENT REFERRED TO:  Cata Greene MD  [address]  [place] [id number] [phone]    Schedule an appointment as soon as possible for a visit in 1 week      PATIENT CONNECTION - [facility] [address]  [phone]  Call   Call to schedule primary care appointment in the next couple days.      DISCHARGE MEDICATIONS:     Medication List        START taking these medications      famotidine 20 MG tablet  Commonly known as: PEPCID  Take 1 tablet by mouth At Night As Needed for Heartburn or Indigestion for up to 30 days.            CONTINUE taking these medications      vitamin D 1.25 MG (12804 UT) capsule capsule  Commonly known as: ERGOCALCIFEROL               Where to Get Your Medications        These medications were sent to [id number] - [place] - [address] AT [facility] [address] - [phone] PH - [phone] FX  [address] [facility] [facility] [address]      Phone: [phone]   famotidine 20 MG tablet         Electronically signed by Curtis Jean DO, 12/05/24, 2:53 PM EST.    Emergency Medicine Physician  [facility]  (Please note that portions of thisnote were completed with a voice recognition program.  Efforts were made to edit the dictations but occasionally  words are mis-transcribed.)       Curtis Jean, DO  12/06/24 1008

## 2025-02-12 ENCOUNTER — OFFICE VISIT (OUTPATIENT)
Dept: GASTROENTEROLOGY | Facility: CLINIC | Age: 60
End: 2025-02-12
Payer: MEDICAID

## 2025-02-12 ENCOUNTER — LAB (OUTPATIENT)
Dept: LAB | Facility: HOSPITAL | Age: 60
End: 2025-02-12
Payer: MEDICAID

## 2025-02-12 VITALS
BODY MASS INDEX: 25.06 KG/M2 | DIASTOLIC BLOOD PRESSURE: 74 MMHG | HEART RATE: 84 BPM | WEIGHT: 137 LBS | SYSTOLIC BLOOD PRESSURE: 126 MMHG | OXYGEN SATURATION: 98 %

## 2025-02-12 DIAGNOSIS — D64.9 NORMOCYTIC ANEMIA: ICD-10-CM

## 2025-02-12 DIAGNOSIS — R13.19 ESOPHAGEAL DYSPHAGIA: Primary | ICD-10-CM

## 2025-02-12 DIAGNOSIS — Z86.39 HISTORY OF VITAMIN D DEFICIENCY: ICD-10-CM

## 2025-02-12 DIAGNOSIS — K21.9 GASTROESOPHAGEAL REFLUX DISEASE, UNSPECIFIED WHETHER ESOPHAGITIS PRESENT: ICD-10-CM

## 2025-02-12 DIAGNOSIS — Z12.12 ENCOUNTER FOR COLORECTAL CANCER SCREENING: ICD-10-CM

## 2025-02-12 DIAGNOSIS — Z86.19 HISTORY OF HEPATITIS C VIRUS INFECTION: ICD-10-CM

## 2025-02-12 DIAGNOSIS — K44.9 HIATAL HERNIA: ICD-10-CM

## 2025-02-12 DIAGNOSIS — Z12.11 ENCOUNTER FOR COLORECTAL CANCER SCREENING: ICD-10-CM

## 2025-02-12 LAB
25(OH)D3 SERPL-MCNC: 40.6 NG/ML (ref 30–100)
ALBUMIN SERPL-MCNC: 4.4 G/DL (ref 3.5–5.2)
ALBUMIN/GLOB SERPL: 1.5 G/DL
ALP SERPL-CCNC: 81 U/L (ref 39–117)
ALT SERPL W P-5'-P-CCNC: 11 U/L (ref 1–33)
ANION GAP SERPL CALCULATED.3IONS-SCNC: 8.7 MMOL/L (ref 5–15)
AST SERPL-CCNC: 19 U/L (ref 1–32)
BILIRUB SERPL-MCNC: 0.4 MG/DL (ref 0–1.2)
BUN SERPL-MCNC: 13 MG/DL (ref 6–20)
BUN/CREAT SERPL: 15.5 (ref 7–25)
CALCIUM SPEC-SCNC: 9.7 MG/DL (ref 8.6–10.5)
CHLORIDE SERPL-SCNC: 104 MMOL/L (ref 98–107)
CO2 SERPL-SCNC: 27.3 MMOL/L (ref 22–29)
CREAT SERPL-MCNC: 0.84 MG/DL (ref 0.57–1)
DEPRECATED RDW RBC AUTO: 40.5 FL (ref 37–54)
EGFRCR SERPLBLD CKD-EPI 2021: 80.2 ML/MIN/1.73
ERYTHROCYTE [DISTWIDTH] IN BLOOD BY AUTOMATED COUNT: 11.8 % (ref 12.3–15.4)
FERRITIN SERPL-MCNC: 164 NG/ML (ref 13–150)
FOLATE SERPL-MCNC: 6.29 NG/ML (ref 4.78–24.2)
GLOBULIN UR ELPH-MCNC: 3 GM/DL
GLUCOSE SERPL-MCNC: 87 MG/DL (ref 65–99)
HCT VFR BLD AUTO: 39.5 % (ref 34–46.6)
HGB BLD-MCNC: 12.6 G/DL (ref 12–15.9)
IRON 24H UR-MRATE: 124 MCG/DL (ref 37–145)
IRON SATN MFR SERPL: 27 % (ref 20–50)
MCH RBC QN AUTO: 29.9 PG (ref 26.6–33)
MCHC RBC AUTO-ENTMCNC: 31.9 G/DL (ref 31.5–35.7)
MCV RBC AUTO: 93.8 FL (ref 79–97)
PLATELET # BLD AUTO: 342 10*3/MM3 (ref 140–450)
PMV BLD AUTO: 9.3 FL (ref 6–12)
POTASSIUM SERPL-SCNC: 4.4 MMOL/L (ref 3.5–5.2)
PROT SERPL-MCNC: 7.4 G/DL (ref 6–8.5)
RBC # BLD AUTO: 4.21 10*6/MM3 (ref 3.77–5.28)
SODIUM SERPL-SCNC: 140 MMOL/L (ref 136–145)
TIBC SERPL-MCNC: 463 MCG/DL (ref 298–536)
TRANSFERRIN SERPL-MCNC: 311 MG/DL (ref 200–360)
VIT B12 BLD-MCNC: 305 PG/ML (ref 211–946)
WBC NRBC COR # BLD AUTO: 6.18 10*3/MM3 (ref 3.4–10.8)

## 2025-02-12 PROCEDURE — 82746 ASSAY OF FOLIC ACID SERUM: CPT

## 2025-02-12 PROCEDURE — 36415 COLL VENOUS BLD VENIPUNCTURE: CPT

## 2025-02-12 PROCEDURE — 83540 ASSAY OF IRON: CPT

## 2025-02-12 PROCEDURE — 82607 VITAMIN B-12: CPT

## 2025-02-12 PROCEDURE — 80053 COMPREHEN METABOLIC PANEL: CPT

## 2025-02-12 PROCEDURE — 82306 VITAMIN D 25 HYDROXY: CPT

## 2025-02-12 PROCEDURE — 82728 ASSAY OF FERRITIN: CPT

## 2025-02-12 PROCEDURE — 85027 COMPLETE CBC AUTOMATED: CPT

## 2025-02-12 PROCEDURE — 84466 ASSAY OF TRANSFERRIN: CPT

## 2025-02-12 RX ORDER — PANTOPRAZOLE SODIUM 40 MG/1
40 TABLET, DELAYED RELEASE ORAL DAILY
Qty: 90 TABLET | Refills: 3 | Status: SHIPPED | OUTPATIENT
Start: 2025-02-12

## 2025-02-12 RX ORDER — SODIUM CHLORIDE 9 MG/ML
30 INJECTION, SOLUTION INTRAVENOUS CONTINUOUS PRN
OUTPATIENT
Start: 2025-02-12 | End: 2025-02-13

## 2025-02-12 RX ORDER — SODIUM CHLORIDE 9 MG/ML
30 INJECTION, SOLUTION INTRAVENOUS CONTINUOUS PRN
Status: CANCELLED | OUTPATIENT
Start: 2025-02-12 | End: 2025-02-13

## 2025-02-12 RX ORDER — IBUPROFEN 600 MG/1
600 TABLET, FILM COATED ORAL
COMMUNITY
Start: 2024-12-09 | End: 2025-02-12

## 2025-02-12 RX ORDER — POLYETHYLENE GLYCOL 3350 17 G/17G
POWDER, FOR SOLUTION ORAL
Qty: 238 G | Refills: 0 | Status: SHIPPED | OUTPATIENT
Start: 2025-02-12

## 2025-02-12 RX ORDER — BISACODYL 5 MG/1
TABLET, DELAYED RELEASE ORAL
Qty: 4 TABLET | Refills: 0 | Status: SHIPPED | OUTPATIENT
Start: 2025-02-12

## 2025-02-12 NOTE — PROGRESS NOTES
Follow Up Note     Date: 2025   Patient Name: Claudia Bocsh  MRN: [id number]  : 1965     Primary Care Provider: Maliha Antoine APRN     Chief Complaint   Patient presents with    Difficulty Swallowing     History of Present Illness  Claudia is a 59-year-old female who presents to the GI office for follow-up regarding dysphagia.    She was seen by the [facility] GI service in 2024 by Dr. Greene due to an esophageal food bolus and had an urgent EGD on 2024, followed by a repeat EGD with dilatation on 2024. She reports persistent dysphagia, characterized by food impaction that can last up to an hour, necessitating continuous water intake to facilitate passage. This symptom has led to at least one recent emergency room visit 2024. She also experiences episodes of gagging and regurgitation, particularly with chunky foods such as fried chicken, mashed potatoes, and broccoli. She expresses concern about the possibility of requiring another EGD. She describes the sensation of food impaction as occurring in the mid-chest region. Additionally, she experiences daily or every other day heartburn but does not have any medication for it. She does not experience nocturnal acid regurgitation but admits to occasional late-night snacking. She has identified coffee as a trigger for her heartburn.    She reports no constipation, diarrhea, rectal bleeding, or abdominal pain. She has never undergone a colonoscopy.    She has a history of hepatitis C, which was successfully treated with medication through [facility]. She does not recall the specific medication or duration of treatment but believes it was either 6 or 8 weeks. Her most recent lab work showed no detectable virus.    She has been diagnosed with vitamin D deficiency and experiences tingling in her feet, which she attributes to poor circulation. She has not had any blood work done since 2024 and is uncertain about her iron or B12  levels.     Supplemental Information  She has lost some teeth, which makes it harder to chew her food.    SOCIAL HISTORY  The patient is trying to quit vaping and is currently using gum as a substitute.    MEDICATIONS  Past: Vitamin D2    Interval History:  3/2024  58-year-old female patient with a prior history of chronic dysphagia, gastroesophageal reflux disease, hepatitis C previously treated was brought into emergency room [facility] on 03/26/2024 with complaints of food bolus stuck in the esophagus.     She states that she has been having issues with swallowing especially with solids for a while now.  For the last couple of weeks she had a significant difficulty in swallowing solids.  She did have a food bolus stuck in the past but passed out spontaneously in the past no prior EGD.  Denies any prior esophageal stricture or dilatation.     Patient received glucagon and did not get any benefit and GI was consulted.    Subjective     Past Medical History:   Diagnosis Date    Hepatitis C      Past Surgical History:   Procedure Laterality Date    ENDOSCOPY N/A 4/25/2024    Procedure: ESOPHAGOGASTRODUODENOSCOPY WITH DILATATION AND BIOPSY;  Surgeon: Cata Greene MD;  Location: [facility] ENDOSCOPY;  Service: Gastroenterology;  Laterality: N/A;    ENDOSCOPY WITH FOREIGN BODY REMOVAL N/A 3/26/2024    Procedure: ESOPHAGOGASTRODUODENOSCOPY WITH FOREIGN BODY REMOVAL and biopsy;  Surgeon: Cata Greene MD;  Location: [facility] ENDOSCOPY;  Service: Gastroenterology;  Laterality: N/A;     Family History   Adopted: Yes   Family history unknown: Yes     Social History     Socioeconomic History    Marital status: Single   Tobacco Use    Smoking status: Former     Current packs/day: 0.50     Types: Cigarettes     Passive exposure: Never    Smokeless tobacco: Never   Vaping Use    Vaping status: Every Day   Substance and Sexual Activity    Alcohol use: Not Currently    Drug use: Not Currently     Types: Heroin,  Cocaine(coke)    Sexual activity: Defer     Current Outpatient Medications:     vitamin D (ERGOCALCIFEROL) 1.25 MG (46300 UT) capsule capsule, Take 1 capsule by mouth 1 (One) Time Per Week., Disp: , Rfl:     No Known Allergies    The following portions of the patient's history were reviewed and updated as appropriate: allergies, current medications, past family history, past medical history, past social history, past surgical history and problem list.    Objective     Physical Exam  Vitals reviewed.   Constitutional:       General: She is not in acute distress.     Appearance: Normal appearance. She is well-developed. She is not ill-appearing or diaphoretic.   HENT:      Head: Normocephalic and atraumatic.      Right Ear: External ear normal.      Left Ear: External ear normal.      Nose: Nose normal.      Mouth/Throat:      Comments: Poor dentition  Eyes:      General: No scleral icterus.        Right eye: No discharge.         Left eye: No discharge.      Conjunctiva/sclera: Conjunctivae normal.   Neck:      Vascular: No JVD.   Cardiovascular:      Rate and Rhythm: Normal rate and regular rhythm.      Heart sounds: Normal heart sounds. No murmur heard.     No friction rub. No gallop.   Pulmonary:      Effort: Pulmonary effort is normal. No respiratory distress.      Breath sounds: Normal breath sounds. No wheezing or rales.   Chest:      Chest wall: No tenderness.   Abdominal:      General: Bowel sounds are normal. There is no distension.      Palpations: Abdomen is soft. There is no mass.      Tenderness: There is no abdominal tenderness. There is no guarding.   Musculoskeletal:         General: No deformity. Normal range of motion.      Cervical back: Normal range of motion.   Skin:     General: Skin is warm and dry.      Findings: No erythema or rash.   Neurological:      Mental Status: She is alert and oriented to person, place, and time.      Coordination: Coordination normal.   Psychiatric:         Mood and  Affect: Mood normal.         Behavior: Behavior normal.         Thought Content: Thought content normal.         Judgment: Judgment normal.       Physical Exam  Lungs were auscultated.  Heart was examined.  Abdominal exam was performed.    Vitals:    02/12/25 1033   BP: 126/74   Pulse: 84   SpO2: 98%   Weight: 62.1 kg (137 lb)     Results Review:   I reviewed the patient's new clinical results.    Admission on 12/05/2024, Discharged on 12/05/2024   Component Date Value Ref Range Status    Protime 12/05/2024 13.8  12.3 - 15.1 Seconds Final    INR 12/05/2024 1.01  0.90 - 1.10 Final    WBC 12/05/2024 17.88 (H)  3.40 - 10.80 10*3/mm3 Final    RBC 12/05/2024 3.62 (L)  3.77 - 5.28 10*6/mm3 Final    Hemoglobin 12/05/2024 11.2 (L)  12.0 - 15.9 g/dL Final    Hematocrit 12/05/2024 34.1  34.0 - 46.6 % Final    MCV 12/05/2024 94.2  79.0 - 97.0 fL Final    MCH 12/05/2024 30.9  26.6 - 33.0 pg Final    MCHC 12/05/2024 32.8  31.5 - 35.7 g/dL Final    RDW 12/05/2024 11.3 (L)  12.3 - 15.4 % Final    RDW-SD 12/05/2024 39.0  37.0 - 54.0 fl Final    MPV 12/05/2024 8.4  6.0 - 12.0 fL Final    Platelets 12/05/2024 402  140 - 450 10*3/mm3 Final      No recent abdominal imaging.   Results  Laboratory Studies  Hemoglobin was 11.2 12/2024.     EGD 3/26/2024   - Normal oropharynx. - Food in the lower third of the esophagus. Removal was successful. - Z-line regular, 34 cm from the incisors. - 3 cm hiatal hernia. - Esophageal ulcer / Inflammation GEJ. - A large amount of food (residue) in the stomach. - Normal duodenal bulb, first portion of the duodenum, second portion of the duodenum and third portion of the duodenum. - Biopsies were taken with a cold forceps for histology in the upper third of the esophagus, in the middle third of the esophagus and in the lower third of the esophagus.  Pathology DIAGNOSIS:   ESOPHAGUS, BIOPSY, RANDOM:   Squamous mucosa with mild reactive/reflux related changes   No increased intraepithelial eosinophils,  intestinal metaplasia or dysplasia   identified     EGD 4/25/2024  - Normal oropharynx. - Z-line regular, 33 cm from the incisors. - 2 cm hiatal hernia. - Obstructing and moderate Schatzki ring. Dilated. Biopsied. - Erythematous mucosa in the prepyloric region of the stomach. Biopsied. - Normal duodenal bulb, first portion of the duodenum, second portion of the duodenum and third portion of the duodenum. - Tough esophageal ring, brocken with cold forceps followed by dilation  Pathology DIAGNOSIS:  A.     ANTRUM AND BODY, BIOPSY:  Gastric mucosa with reactive changes  No Helicobacter pylori-like organisms seen  Negative for dysplasia or malignancy  B.     ESOPHAGUS, BIOPSIES, RANDOM:  Squamous mucosa with reactive changes  Negative for significantly increased eosinophils, dysplasia, or malignancy     Assessment / Plan      1. Esophageal dysphagia  2. Hiatal hernia  3. Gastroesophageal reflux disease, unspecified whether esophagitis present  Dysphagia present for years but recently became worse again. Prior dilatation helped 4/2024. At that time, she had 2 cm hiatal hernia and moderate Schatzki's ring. She reports that food, especially meats and chunky foods, gets stuck and sometimes causes gagging and vomiting. She experiences heartburn almost daily and has run out of her previous medication. Suspect dysphagia related to hiatal hernia and esophageal ring plus GERD.     EGD will be arranged  Acid reflux measures  Start protonix 40 mg once daily  Avoid all NSAIDs  Avoid Alcohol    - pantoprazole (PROTONIX) 40 MG EC tablet; Take 1 tablet by mouth Daily.  Dispense: 90 tablet; Refill: 3    4. Encounter for colorectal cancer screening  5. Normocytic anemia  6. History of vitamin D deficiency  She has never had a colonoscopy. No family history of colon cancer. 12/2024 hemoglobin was 11.2, indicating mild anemia. Has a personal history of Vit D deficiency on supplement, would like this checked. No current abdominal pain. No  rectal bleeding or black stools. No recent abdominal imaging.     Labs today  Continue Vit D supplement weekly  EGD and colonoscopy will be arranged- separate procedures  If Hgb still low on labs, she needs CTAP with IV and oral contrast    - CBC (No Diff); Future  - Comprehensive Metabolic Panel; Future  - Iron Profile; Future  - Ferritin; Future  - Vitamin B12; Future  - Folate; Future  - Vitamin D 25 Hydroxy; Future    She will have an esophagogastroduodenoscopy with possible esophageal dilatation and colonoscopy (separate) performed with monitored anesthesia care. The indications, technique, alternatives and potential risk and complications were discussed with the patient including but not limited to bleeding, bowel perforations, missing lesions and anesthetic complications. The patient understands and wishes to proceed with the procedure and has given their verbal consent. Written patient education information was given to the patient. She should follow up in the office after this procedure to discuss the results and further recommendations can be made at that time. The patient will call if they have further questions before procedure.  - Case Request  - polyethylene glycol (MIRALAX) 17 GM/SCOOP powder; Follow directions given at office  Dispense: 238 g; Refill: 0  - bisacodyl (Dulcolax) 5 MG EC tablet; Follow instructions given at office  Dispense: 4 tablet; Refill: 0    7. History of Hep C virus infection  She has history of Hep C, treated and cured per her report, confirmed by a negative HCV RNA in 2021. Labs in 2023 show normal liver enzymes. No further treatment/monitoring is required at this time.    Follow Up:   Return for follow up after procedure to discuss results.    Nguyen Jacques PA-C  [facility]  2/14/2025  15:51 EST    Patient or patient representative verbalized consent for the use of Ambient Listening during the visit with  Nguyen Jacques PA-C for chart documentation. 2/14/2025   11:18 EST

## 2025-03-02 ENCOUNTER — PATIENT ROUNDING (BHMG ONLY) (OUTPATIENT)
Dept: URGENT CARE | Facility: CLINIC | Age: 60
End: 2025-03-02
Payer: MEDICAID

## 2025-03-04 ENCOUNTER — TELEPHONE (OUTPATIENT)
Dept: GASTROENTEROLOGY | Facility: CLINIC | Age: 60
End: 2025-03-04
Payer: MEDICAID

## 2025-03-04 DIAGNOSIS — Z12.11 ENCOUNTER FOR COLORECTAL CANCER SCREENING: Primary | ICD-10-CM

## 2025-03-04 DIAGNOSIS — Z12.12 ENCOUNTER FOR COLORECTAL CANCER SCREENING: Primary | ICD-10-CM

## 2025-03-04 RX ORDER — SODIUM CHLORIDE 9 MG/ML
30 INJECTION, SOLUTION INTRAVENOUS CONTINUOUS PRN
OUTPATIENT
Start: 2025-03-04 | End: 2025-03-05

## 2025-03-13 NOTE — PRE-PROCEDURE INSTRUCTIONS
PAT phone history completed with patient for upcoming procedure on 3/14/25, with Dr. Greene.    PAT PASS reviewed with patient and they verbalize understanding of the following:     Do not eat or drink anything after midnight the night before procedure unless otherwise instructed by physician/surgeon's office, this includes no gum, candy, mints, tobacco products or e-cigarettes.  Do not shave the area to be operated on at least 48 hours prior to procedure.  Do not wear makeup, lotion, hair products, or nail polish.  Do not wear any jewelry and remove all piercings.  Do not wear any adhesive if you wear dentures.  Do not wear contacts; bring in glasses if needed.  Only take medications on the morning of procedure as instructed by PAT nurse per anesthesia guidelines or as instructed by physician's office.  If you are on any blood thinners reach out to the physician/surgeon's office for instructions on when/if they will need to be stopped prior to procedure.  Bring in picture ID and insurance card, advanced directive copies if applicable, CPAP/BIPAP/Inhalers if indicated morning of procedure, leave any other valuables at home.  Ensure you have arranged for someone to drive you home the day of your procedure and someone to care for you at home afterwards. It is recommended that you do not drive, drink alcohol, or make any major legal decisions for at least 24 hours after your procedure is complete.  ERAS instructions given unless otherwise instructed per surgeon's orders.    Instructions given on hospital entrance and registration location.

## 2025-03-14 ENCOUNTER — ANESTHESIA EVENT (OUTPATIENT)
Dept: GASTROENTEROLOGY | Facility: HOSPITAL | Age: 60
End: 2025-03-14
Payer: MEDICAID

## 2025-03-14 ENCOUNTER — HOSPITAL ENCOUNTER (OUTPATIENT)
Facility: HOSPITAL | Age: 60
Setting detail: HOSPITAL OUTPATIENT SURGERY
Discharge: HOME | End: 2025-03-14
Attending: INTERNAL MEDICINE | Admitting: INTERNAL MEDICINE
Payer: MEDICAID

## 2025-03-14 ENCOUNTER — ANESTHESIA (OUTPATIENT)
Dept: GASTROENTEROLOGY | Facility: HOSPITAL | Age: 60
End: 2025-03-14
Payer: MEDICAID

## 2025-03-14 VITALS
TEMPERATURE: 97 F | OXYGEN SATURATION: 97 % | BODY MASS INDEX: 25.49 KG/M2 | WEIGHT: 135 LBS | SYSTOLIC BLOOD PRESSURE: 104 MMHG | HEIGHT: 61 IN | HEART RATE: 84 BPM | DIASTOLIC BLOOD PRESSURE: 89 MMHG | RESPIRATION RATE: 20 BRPM

## 2025-03-14 DIAGNOSIS — Z12.12 ENCOUNTER FOR COLORECTAL CANCER SCREENING: ICD-10-CM

## 2025-03-14 DIAGNOSIS — Z12.11 ENCOUNTER FOR COLORECTAL CANCER SCREENING: ICD-10-CM

## 2025-03-14 PROCEDURE — 25010000002 PROPOFOL 200 MG/20ML EMULSION: Performed by: NURSE ANESTHETIST, CERTIFIED REGISTERED

## 2025-03-14 PROCEDURE — 25810000003 SODIUM CHLORIDE 0.9 % SOLUTION: Performed by: PHYSICIAN ASSISTANT

## 2025-03-14 PROCEDURE — 45378 DIAGNOSTIC COLONOSCOPY: CPT | Performed by: INTERNAL MEDICINE

## 2025-03-14 RX ORDER — SODIUM CHLORIDE 9 MG/ML
30 INJECTION, SOLUTION INTRAVENOUS CONTINUOUS PRN
Status: DISCONTINUED | OUTPATIENT
Start: 2025-03-14 | End: 2025-03-14 | Stop reason: HOSPADM

## 2025-03-14 RX ORDER — LIDOCAINE HCL/PF 100 MG/5ML
SYRINGE (ML) INJECTION AS NEEDED
Status: DISCONTINUED | OUTPATIENT
Start: 2025-03-14 | End: 2025-03-14 | Stop reason: SURG

## 2025-03-14 RX ORDER — PROPOFOL 10 MG/ML
INJECTION, EMULSION INTRAVENOUS CONTINUOUS PRN
Status: DISCONTINUED | OUTPATIENT
Start: 2025-03-14 | End: 2025-03-14 | Stop reason: SURG

## 2025-03-14 RX ORDER — SIMETHICONE 40MG/0.6ML
SUSPENSION, DROPS(FINAL DOSAGE FORM)(ML) ORAL AS NEEDED
Status: DISCONTINUED | OUTPATIENT
Start: 2025-03-14 | End: 2025-03-14 | Stop reason: HOSPADM

## 2025-03-14 RX ADMIN — SODIUM CHLORIDE 30 ML/HR: 9 INJECTION, SOLUTION INTRAVENOUS at 07:13

## 2025-03-14 RX ADMIN — Medication 75 MG: at 07:44

## 2025-03-14 RX ADMIN — PROPOFOL 150 MCG/KG/MIN: 10 INJECTION, EMULSION INTRAVENOUS at 07:44

## 2025-03-14 NOTE — DISCHARGE INSTRUCTIONS
Rest today  No pushing,pulling,tugging,heavy lifting, or strenuous activity   No major decision making,driving,or drinking alcoholic beverages for 24 hours due to the sedation you received  Always use good hand hygiene/washing technique  No driving on pain medication.    To assist you in voiding:  Drink plenty of fluids  Listen to running water while attempting to void.    If you are unable to urinate and you have an uncomfortable urge to void or it has been   6 hours since you were discharged, return to the Emergency Room.    - Discharge patient to home (ambulatory).   - High fiber diet.   - Continue present medications.   - Repeat colonoscopy in 6 months because the bowel preparation was poor.   - Return to GI office in 8 weeks.

## 2025-03-14 NOTE — ANESTHESIA POSTPROCEDURE EVALUATION
Patient: Claudia Bosch    Procedure Summary       Date: 03/14/25 Room / Location: [facility] ENDOSCOPY 2 / [facility] ENDOSCOPY    Anesthesia Start: 0737 Anesthesia Stop: 0809    Procedure: Colonoscopy Diagnosis:       Encounter for colorectal cancer screening      (Encounter for colorectal cancer screening [Z12.11, Z12.12])    Surgeons: Cata Greene MD Provider: Judd Hayes CRNA    Anesthesia Type: MAC ASA Status: 3            Anesthesia Type: MAC    Vitals  No vitals data found for the desired time range.          Post Anesthesia Care and Evaluation    Patient location during evaluation: bedside  Patient participation: complete - patient participated  Level of consciousness: awake and alert  Pain score: 0  Pain management: adequate    Airway patency: patent  Anesthetic complications: No anesthetic complications  PONV Status: none  Cardiovascular status: acceptable  Respiratory status: acceptable  Hydration status: acceptable

## 2025-03-14 NOTE — ANESTHESIA PREPROCEDURE EVALUATION
Anesthesia Evaluation     Patient summary reviewed and Nursing notes reviewed   NPO Solid Status: > 8 hours  NPO Liquid Status: > 8 hours           Airway   Mallampati: II  TM distance: >3 FB  Neck ROM: full  Possible difficult intubation and No difficulty expected  Dental - normal exam     Pulmonary - normal exam   (+) a smoker Former,  Cardiovascular - negative cardio ROS and normal exam  Exercise tolerance: good (4-7 METS)        Neuro/Psych- negative ROS  GI/Hepatic/Renal/Endo    (+) hepatitis C, liver disease, diabetes mellitus    Musculoskeletal (-) negative ROS    Abdominal  - normal exam    Bowel sounds: normal.   Substance History - negative use     OB/GYN negative ob/gyn ROS         Other                      Anesthesia Plan    ASA 3     MAC     (Risks and benefits discussed including risk of aspiration, recall and dental damage. All patient questions answered. Will continue with POC. )  intravenous induction     Anesthetic plan, risks, benefits, and alternatives have been provided, discussed and informed consent has been obtained with: patient.  Pre-procedure education provided      CODE STATUS:

## 2025-03-14 NOTE — H&P
[facility]  HISTORY AND PHYSICAL    Patient Name: Claudia Bosch  : 1965  MRN: [id number]    Chief Complaint:   For screening colonoscopy    History Of Presenting Illness:    Average risk screening    Past Medical History:   Diagnosis Date    Diabetes mellitus     GERD (gastroesophageal reflux disease)     Hepatitis C        Past Surgical History:   Procedure Laterality Date    DILATATION AND CURETTAGE  2025    ENDOSCOPY N/A 2024    Procedure: ESOPHAGOGASTRODUODENOSCOPY WITH DILATATION AND BIOPSY;  Surgeon: Cata Greene MD;  Location: [facility] ENDOSCOPY;  Service: Gastroenterology;  Laterality: N/A;    ENDOSCOPY WITH FOREIGN BODY REMOVAL N/A 2024    Procedure: ESOPHAGOGASTRODUODENOSCOPY WITH FOREIGN BODY REMOVAL and biopsy;  Surgeon: Cata Greene MD;  Location: [facility] ENDOSCOPY;  Service: Gastroenterology;  Laterality: N/A;       Social History     Socioeconomic History    Marital status: Single   Tobacco Use    Smoking status: Former     Current packs/day: 0.50     Types: Cigarettes     Passive exposure: Never    Smokeless tobacco: Never   Vaping Use    Vaping status: Every Day    Substances: Nicotine   Substance and Sexual Activity    Alcohol use: Not Currently    Drug use: Not Currently     Types: Heroin, Cocaine(coke)    Sexual activity: Defer       Family History   Adopted: Yes   Family history unknown: Yes       Prior to Admission Medications:  Medications Prior to Admission   Medication Sig Dispense Refill Last Dose/Taking    bisacodyl (Dulcolax) 5 MG EC tablet Follow instructions given at office 4 tablet 0 3/13/2025    cephalexin (KEFLEX) 500 MG capsule Take 1 capsule by mouth 3 (Three) Times a Day. 30 capsule 0 3/13/2025    neomycin-polymyxin-hydrocortisone (CORTISPORIN) 3.5-72666-2 otic solution Administer 3 drops into the left ear 4 (Four) Times a Day. 10 mL 0 3/13/2025    pantoprazole (PROTONIX) 40 MG EC tablet Take 1 tablet by mouth Daily.  90 tablet 3 3/13/2025    polyethylene glycol (MIRALAX) 17 GM/SCOOP powder Follow directions given at office 238 g 0 3/13/2025    vitamin D (ERGOCALCIFEROL) 1.25 MG (59291 UT) capsule capsule Take 1 capsule by mouth 1 (One) Time Per Week.   3/13/2025       Allergies:  No Known Allergies     Vitals: Temp:  [97 °F (36.1 °C)] 97 °F (36.1 °C)  Heart Rate:  [64] 64  Resp:  [16] 16  BP: (125)/(59) 125/59    Review Of Systems:  Constitutional:  Negative for chills, fever, and unexpected weight change.  Respiratory:  Negative for cough, chest tightness, shortness of breath, and wheezing.  Cardiovascular:  Negative for chest pain, palpitations, and leg swelling.  Gastrointestinal:  Negative for abdominal distention, abdominal pain, nausea, vomiting.  Neurological:  Negative for weakness, numbness, and headaches.     Physical Exam:    General Appearance:  Alert, cooperative, in no acute distress.   Lungs:   Clear to auscultation, respirations regular, even and                 unlabored.   Heart:  Regular rhythm and normal rate.   Abdomen:   Normal bowel sounds, no masses, no organomegaly. Soft, nontender, nondistended   Neurologic: Alert and oriented x 3. Moves all four limbs equally       Assessment & Plan     Assessment:  Principal Problem:    Encounter for colorectal cancer screening      Plan: Colonoscopy with (N/A)     Cata Greene MD  3/14/2025

## 2025-05-12 ENCOUNTER — TRANSCRIBE ORDERS (OUTPATIENT)
Dept: ADMINISTRATIVE | Facility: HOSPITAL | Age: 60
End: 2025-05-12
Payer: MEDICAID

## 2025-05-12 DIAGNOSIS — Z12.31 SCREENING MAMMOGRAM, ENCOUNTER FOR: Primary | ICD-10-CM

## 2025-06-24 ENCOUNTER — OFFICE VISIT (OUTPATIENT)
Dept: GASTROENTEROLOGY | Facility: CLINIC | Age: 60
End: 2025-06-24
Payer: MEDICAID

## 2025-06-24 VITALS
BODY MASS INDEX: 27.21 KG/M2 | OXYGEN SATURATION: 97 % | WEIGHT: 144 LBS | SYSTOLIC BLOOD PRESSURE: 122 MMHG | DIASTOLIC BLOOD PRESSURE: 80 MMHG | HEART RATE: 68 BPM

## 2025-06-24 DIAGNOSIS — Z12.11 COLON CANCER SCREENING: ICD-10-CM

## 2025-06-24 DIAGNOSIS — K21.9 GASTROESOPHAGEAL REFLUX DISEASE, UNSPECIFIED WHETHER ESOPHAGITIS PRESENT: ICD-10-CM

## 2025-06-24 DIAGNOSIS — R13.19 ESOPHAGEAL DYSPHAGIA: Primary | ICD-10-CM

## 2025-06-24 PROCEDURE — 1160F RVW MEDS BY RX/DR IN RCRD: CPT | Performed by: PHYSICIAN ASSISTANT

## 2025-06-24 PROCEDURE — 99213 OFFICE O/P EST LOW 20 MIN: CPT | Performed by: PHYSICIAN ASSISTANT

## 2025-06-24 PROCEDURE — 1159F MED LIST DOCD IN RCRD: CPT | Performed by: PHYSICIAN ASSISTANT

## 2025-06-24 NOTE — PROGRESS NOTES
Follow Up Note     Date: 2025   Patient Name: Claudia Bosch  MRN: [id number]  : 1965     Primary Care Provider: Maliha Antoine APRN     Chief Complaint   Patient presents with    Results     Colonoscopy     History of present illness:   2025  Claudia Bosch is a 59 y.o. female who is here today for follow up regarding Results (Colonoscopy).    Initially was scheduled for both EGD and colonoscopy but asked to cancel EGD and proceed with colonoscopy only 3/14/2025. She had poor prep for colonoscopy then later had cologuard which was negative. Overall, she is feeling fine today. No current GI complaints. No current dysphagia, it gradually resolved since last EGD.     Interval History:  2025  She was seen by the [facility] GI service in 2024 by Dr. Greene due to an esophageal food bolus and had an urgent EGD on 2024, followed by a repeat EGD with dilatation on 2024. She reports persistent dysphagia, characterized by food impaction that can last up to an hour, necessitating continuous water intake to facilitate passage. This symptom has led to at least one recent emergency room visit 2024. She also experiences episodes of gagging and regurgitation, particularly with chunky foods such as fried chicken, mashed potatoes, and broccoli. She expresses concern about the possibility of requiring another EGD. She describes the sensation of food impaction as occurring in the mid-chest region. Additionally, she experiences daily or every other day heartburn but does not have any medication for it. She does not experience nocturnal acid regurgitation but admits to occasional late-night snacking. She has identified coffee as a trigger for her heartburn.     She reports no constipation, diarrhea, rectal bleeding, or abdominal pain. She has never undergone a colonoscopy.     She has a history of hepatitis C, which was successfully treated with medication through [facility]. She does not  recall the specific medication or duration of treatment but believes it was either 6 or 8 weeks. Her most recent lab work showed no detectable virus.     She has been diagnosed with vitamin D deficiency and experiences tingling in her feet, which she attributes to poor circulation. She has not had any blood work done since December 2024 and is uncertain about her iron or B12 levels.      Supplemental Information  She has lost some teeth, which makes it harder to chew her food.     SOCIAL HISTORY  The patient is trying to quit vaping and is currently using gum as a substitute.     MEDICATIONS  Past: Vitamin D2    Subjective     Past Medical History:   Diagnosis Date    Diabetes mellitus     GERD (gastroesophageal reflux disease)     Hepatitis C     Hernia      Past Surgical History:   Procedure Laterality Date    COLONOSCOPY N/A 03/14/2025    Procedure: Colonoscopy;  Surgeon: Cata Greene MD;  Location: [facility] ENDOSCOPY;  Service: Gastroenterology;  Laterality: N/A;    DILATATION AND CURETTAGE  03/13/2025    ENDOSCOPY N/A 04/25/2024    Procedure: ESOPHAGOGASTRODUODENOSCOPY WITH DILATATION AND BIOPSY;  Surgeon: Cata Greene MD;  Location: [facility] ENDOSCOPY;  Service: Gastroenterology;  Laterality: N/A;    ENDOSCOPY WITH FOREIGN BODY REMOVAL N/A 03/26/2024    Procedure: ESOPHAGOGASTRODUODENOSCOPY WITH FOREIGN BODY REMOVAL and biopsy;  Surgeon: Cata Greene MD;  Location: [facility] ENDOSCOPY;  Service: Gastroenterology;  Laterality: N/A;    HERNIA REPAIR       Family History   Adopted: Yes   Family history unknown: Yes     Social History     Socioeconomic History    Marital status: Single   Tobacco Use    Smoking status: Former     Current packs/day: 0.50     Types: Cigarettes     Passive exposure: Never    Smokeless tobacco: Never   Vaping Use    Vaping status: Every Day    Substances: Nicotine   Substance and Sexual Activity    Alcohol use: Not Currently    Drug use: Not Currently     Types:  Cocaine(coke), Heroin     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    Sexual activity: Not Currently     Partners: Male     Birth control/protection: Post-menopausal     Current Outpatient Medications:     pantoprazole (PROTONIX) 40 MG EC tablet, Take 1 tablet by mouth Daily., Disp: 90 tablet, Rfl: 3    cephalexin (KEFLEX) 500 MG capsule, Take 1 capsule by mouth 3 (Three) Times a Day. (Patient not taking: Reported on 6/24/2025), Disp: 30 capsule, Rfl: 0    neomycin-polymyxin-hydrocortisone (CORTISPORIN) 3.5-29014-0 otic solution, Administer 3 drops into the left ear 4 (Four) Times a Day. (Patient not taking: Reported on 6/24/2025), Disp: 10 mL, Rfl: 0    vitamin D (ERGOCALCIFEROL) 1.25 MG (47848 UT) capsule capsule, Take 1 capsule by mouth 1 (One) Time Per Week. (Patient not taking: Reported on 6/24/2025), Disp: , Rfl:     No Known Allergies    The following portions of the patient's history were reviewed and updated as appropriate: allergies, current medications, past family history, past medical history, past social history, past surgical history and problem list.    Objective     Physical Exam  Constitutional:       General: She is not in acute distress.     Appearance: Normal appearance. She is well-developed. She is not diaphoretic.   HENT:      Head: Normocephalic and atraumatic.      Right Ear: External ear normal.      Left Ear: External ear normal.      Nose: Nose normal.   Eyes:      General: No scleral icterus.        Right eye: No discharge.         Left eye: No discharge.      Conjunctiva/sclera: Conjunctivae normal.   Neck:      Trachea: No tracheal deviation.   Pulmonary:      Effort: Pulmonary effort is normal. No respiratory distress.   Musculoskeletal:         General: Normal range of motion.      Cervical back: Normal range of motion.   Skin:     Coloration: Skin is not pale.      Findings: No erythema or rash.   Neurological:      Mental Status: She is alert and oriented to person, place, and  time.      Coordination: Coordination normal.   Psychiatric:         Mood and Affect: Mood normal.         Behavior: Behavior normal.         Thought Content: Thought content normal.         Judgment: Judgment normal.       Vitals:    06/24/25 1019   BP: 122/80   Pulse: 68   SpO2: 97%   Weight: 65.3 kg (144 lb)     Results Review:   I reviewed the patient's new clinical results.    Lab on 02/12/2025   Component Date Value Ref Range Status    25 Hydroxy, Vitamin D 02/12/2025 40.6  30.0 - 100.0 ng/ml Final    WBC 02/12/2025 6.18  3.40 - 10.80 10*3/mm3 Final    RBC 02/12/2025 4.21  3.77 - 5.28 10*6/mm3 Final    Hemoglobin 02/12/2025 12.6  12.0 - 15.9 g/dL Final    Hematocrit 02/12/2025 39.5  34.0 - 46.6 % Final    MCV 02/12/2025 93.8  79.0 - 97.0 fL Final    MCH 02/12/2025 29.9  26.6 - 33.0 pg Final    MCHC 02/12/2025 31.9  31.5 - 35.7 g/dL Final    RDW 02/12/2025 11.8 (L)  12.3 - 15.4 % Final    RDW-SD 02/12/2025 40.5  37.0 - 54.0 fl Final    MPV 02/12/2025 9.3  6.0 - 12.0 fL Final    Platelets 02/12/2025 342  140 - 450 10*3/mm3 Final    Glucose 02/12/2025 87  65 - 99 mg/dL Final    BUN 02/12/2025 13  6 - 20 mg/dL Final    Creatinine 02/12/2025 0.84  0.57 - 1.00 mg/dL Final    Sodium 02/12/2025 140  136 - 145 mmol/L Final    Potassium 02/12/2025 4.4  3.5 - 5.2 mmol/L Final    Chloride 02/12/2025 104  98 - 107 mmol/L Final    CO2 02/12/2025 27.3  22.0 - 29.0 mmol/L Final    Calcium 02/12/2025 9.7  8.6 - 10.5 mg/dL Final    Total Protein 02/12/2025 7.4  6.0 - 8.5 g/dL Final    Albumin 02/12/2025 4.4  3.5 - 5.2 g/dL Final    ALT (SGPT) 02/12/2025 11  1 - 33 U/L Final    AST (SGOT) 02/12/2025 19  1 - 32 U/L Final    Alkaline Phosphatase 02/12/2025 81  39 - 117 U/L Final    Total Bilirubin 02/12/2025 0.4  0.0 - 1.2 mg/dL Final    Globulin 02/12/2025 3.0  gm/dL Final    A/G Ratio 02/12/2025 1.5  g/dL Final    BUN/Creatinine Ratio 02/12/2025 15.5  7.0 - 25.0 Final    Anion Gap 02/12/2025 8.7  5.0 - 15.0 mmol/L Final    eGFR  02/12/2025 80.2  >60.0 mL/min/1.73 Final    Iron 02/12/2025 124  37 - 145 mcg/dL Final    Iron Saturation (TSAT) 02/12/2025 27  20 - 50 % Final    Transferrin 02/12/2025 311  200 - 360 mg/dL Final    TIBC 02/12/2025 463  298 - 536 mcg/dL Final    Ferritin 02/12/2025 164.00 (H)  13.00 - 150.00 ng/mL Final    Vitamin B-12 02/12/2025 305  211 - 946 pg/mL Final    Folate 02/12/2025 6.29  4.78 - 24.20 ng/mL Final     EGD 3/26/2024   - Normal oropharynx. - Food in the lower third of the esophagus. Removal was successful. - Z-line regular, 34 cm from the incisors. - 3 cm hiatal hernia. - Esophageal ulcer / Inflammation GEJ. - A large amount of food (residue) in the stomach. - Normal duodenal bulb, first portion of the duodenum, second portion of the duodenum and third portion of the duodenum. - Biopsies were taken with a cold forceps for histology in the upper third of the esophagus, in the middle third of the esophagus and in the lower third of the esophagus.  Pathology DIAGNOSIS:   ESOPHAGUS, BIOPSY, RANDOM:   Squamous mucosa with mild reactive/reflux related changes   No increased intraepithelial eosinophils, intestinal metaplasia or dysplasia   identified      EGD 4/25/2024  - Normal oropharynx. - Z-line regular, 33 cm from the incisors. - 2 cm hiatal hernia. - Obstructing and moderate Schatzki ring. Dilated. Biopsied. - Erythematous mucosa in the prepyloric region of the stomach. Biopsied. - Normal duodenal bulb, first portion of the duodenum, second portion of the duodenum and third portion of the duodenum. - Tough esophageal ring, brocken with cold forceps followed by dilation  Pathology DIAGNOSIS:  A.     ANTRUM AND BODY, BIOPSY:  Gastric mucosa with reactive changes  No Helicobacter pylori-like organisms seen  Negative for dysplasia or malignancy  B.     ESOPHAGUS, BIOPSIES, RANDOM:  Squamous mucosa with reactive changes  Negative for significantly increased eosinophils, dysplasia, or malignancy    Colonoscopy  3/14/2025  - Preparation of the colon was inadequate. - Decreased sphincter tone found on digital rectal exam. - Anal papilla(e) were hypertrophied. - Stool in the entire examined colon. - Patient had solid food and did not follow th prep instructions.    No specimens collected.    Cologuard negative 6/2025    Assessment / Plan      1. Esophageal dysphagia  2. Gastroesophageal reflux disease, unspecified whether esophagitis present  Dysphagia present for years but  became worse 2024. Dilatation helped 4/2024. At that time, she had 2 cm hiatal hernia and moderate Schatzki's ring. She reports that food, especially meats and chunky foods, was previously getting stuck and sometimes caused gagging and vomiting. Dysphagia resolved now. She experiences heartburn almost daily if she does not take PPI daily. Suspect dysphagia related to hiatal hernia and esophageal ring plus GERD, improved now.      Acid reflux measures  Continue protonix 40 mg once daily  Avoid all NSAIDs  Avoid Alcohol    3. Colon cancer screening  She had never had a colonoscopy. Attempted colonoscopy 3/2025 but prep inadequate. No family history of colon cancer. 12/2024 hemoglobin was 11.2, indicating mild anemia. This improved to normal at 12 and iron normal on last labs 2/2025. Had cologuard negative 6/5/2025.     Repeat cologuard in 3 years, due 6/2028      Prior history   History of Hep C virus infection  She has history of Hep C, treated and cured per her report, confirmed by a negative HCV RNA in 2021. Labs in 2023 show normal liver enzymes. No further treatment/monitoring is required at this time.    Follow Up:   Return if symptoms worsen or fail to improve.    Nguyen Jacques PA-C  [facility]  6/24/2025  16:19 EDT

## (undated) DEVICE — ENDOSCOPY PORT CONNECTOR FOR OLYMPUS® SCOPES: Brand: ERBE

## (undated) DEVICE — CANISTER, RIGID, 2000CC: Brand: MEDLINE INDUSTRIES, INC.

## (undated) DEVICE — SUCTION CANISTER, 1500CC, RIGID: Brand: DEROYAL

## (undated) DEVICE — FRCP BIOP RADIALJAW4 STD/CAP 2.2MM 240CM ORNG

## (undated) DEVICE — ESOPHAGEAL BALLOON DILATATION CATHETER: Brand: CRE FIXED WIRE

## (undated) DEVICE — Device

## (undated) DEVICE — LUBE JELLY PK/2.75GM STRL BX/144

## (undated) DEVICE — CONMED SCOPE SAVER BITE BLOCK, 20X27 MM: Brand: SCOPE SAVER

## (undated) DEVICE — SYR LUER SLPTP 50ML

## (undated) DEVICE — HYBRID CO2 TUBING/CAP SET FOR OLYMPUS® SCOPES & CO2 SOURCE: Brand: ERBE

## (undated) DEVICE — HYBRID TUBING/CAP SET FOR OLYMPUS® SCOPES: Brand: ERBE

## (undated) DEVICE — VLV SXN AIR/H2O ORCAPOD3 1P/U STRL

## (undated) DEVICE — NET RESCUENET F/B RETRV

## (undated) DEVICE — DEV INFL ALLIANCE2 SYS

## (undated) DEVICE — FRCP BX RADJAW4 NDL 2.8 240 STD OG